# Patient Record
Sex: MALE | Race: WHITE | NOT HISPANIC OR LATINO | Employment: OTHER | URBAN - METROPOLITAN AREA
[De-identification: names, ages, dates, MRNs, and addresses within clinical notes are randomized per-mention and may not be internally consistent; named-entity substitution may affect disease eponyms.]

---

## 2019-11-12 RX ORDER — GLIPIZIDE AND METFORMIN HCL 2.5; 5 MG/1; MG/1
TABLET, FILM COATED ORAL
Qty: 180 TABLET | Refills: 3 | Status: SHIPPED | OUTPATIENT
Start: 2019-11-12 | End: 2020-02-27 | Stop reason: SDUPTHER

## 2019-11-25 DIAGNOSIS — I10 HTN (HYPERTENSION): ICD-10-CM

## 2019-11-25 RX ORDER — LISINOPRIL 20 MG/1
TABLET ORAL
Qty: 90 TABLET | Refills: 3 | Status: ON HOLD | OUTPATIENT
Start: 2019-11-25 | End: 2019-12-06 | Stop reason: HOSPADM

## 2019-11-25 RX ORDER — AMLODIPINE BESYLATE 5 MG/1
TABLET ORAL
Qty: 90 TABLET | Refills: 3 | Status: ON HOLD | OUTPATIENT
Start: 2019-11-25 | End: 2019-12-06 | Stop reason: HOSPADM

## 2019-12-02 ENCOUNTER — HOSPITAL ENCOUNTER (INPATIENT)
Facility: HOSPITAL | Age: 84
LOS: 4 days | Discharge: HOME HEALTH | DRG: 280 | End: 2019-12-06
Attending: EMERGENCY MEDICINE | Admitting: INTERNAL MEDICINE
Payer: MEDICARE

## 2019-12-02 ENCOUNTER — OFFICE VISIT (OUTPATIENT)
Dept: FAMILY MEDICINE | Facility: CLINIC | Age: 84
End: 2019-12-02
Payer: MEDICARE

## 2019-12-02 VITALS
HEIGHT: 70 IN | OXYGEN SATURATION: 84 % | DIASTOLIC BLOOD PRESSURE: 94 MMHG | RESPIRATION RATE: 18 BRPM | SYSTOLIC BLOOD PRESSURE: 162 MMHG | BODY MASS INDEX: 32.98 KG/M2 | WEIGHT: 230.38 LBS | TEMPERATURE: 98 F | HEART RATE: 93 BPM

## 2019-12-02 DIAGNOSIS — R06.02 SOB (SHORTNESS OF BREATH): Primary | ICD-10-CM

## 2019-12-02 DIAGNOSIS — I10 HYPERTENSION, UNSPECIFIED TYPE: ICD-10-CM

## 2019-12-02 DIAGNOSIS — J96.01 ACUTE RESPIRATORY FAILURE WITH HYPOXIA: ICD-10-CM

## 2019-12-02 DIAGNOSIS — R54 AGE-RELATED PHYSICAL DEBILITY: ICD-10-CM

## 2019-12-02 DIAGNOSIS — I50.9 CONGESTIVE HEART FAILURE, UNSPECIFIED HF CHRONICITY, UNSPECIFIED HEART FAILURE TYPE: Primary | ICD-10-CM

## 2019-12-02 DIAGNOSIS — E11.9 TYPE 2 DIABETES MELLITUS WITHOUT COMPLICATION, WITHOUT LONG-TERM CURRENT USE OF INSULIN: ICD-10-CM

## 2019-12-02 DIAGNOSIS — R79.89 ELEVATED TROPONIN: ICD-10-CM

## 2019-12-02 DIAGNOSIS — I45.3 BUNDLE BRANCH BLOCK, TRIFASCICULAR: ICD-10-CM

## 2019-12-02 DIAGNOSIS — R06.02 SOB (SHORTNESS OF BREATH): ICD-10-CM

## 2019-12-02 DIAGNOSIS — R06.02 SHORTNESS OF BREATH: ICD-10-CM

## 2019-12-02 PROBLEM — J96.91 RESPIRATORY FAILURE WITH HYPOXIA: Status: ACTIVE | Noted: 2019-12-02

## 2019-12-02 PROBLEM — J81.1 PULMONARY EDEMA: Status: ACTIVE | Noted: 2019-12-02

## 2019-12-02 LAB
ALBUMIN SERPL BCP-MCNC: 3.8 G/DL (ref 3.5–5.2)
ALP SERPL-CCNC: 104 U/L (ref 55–135)
ALT SERPL W/O P-5'-P-CCNC: 30 U/L (ref 10–44)
ANION GAP SERPL CALC-SCNC: 10 MMOL/L (ref 8–16)
AST SERPL-CCNC: 19 U/L (ref 10–40)
BASOPHILS # BLD AUTO: 0.02 K/UL (ref 0–0.2)
BASOPHILS NFR BLD: 0.3 % (ref 0–1.9)
BILIRUB SERPL-MCNC: 1.4 MG/DL (ref 0.1–1)
BNP SERPL-MCNC: 1244 PG/ML (ref 0–99)
BUN SERPL-MCNC: 16 MG/DL (ref 8–23)
CALCIUM SERPL-MCNC: 9.7 MG/DL (ref 8.7–10.5)
CHLORIDE SERPL-SCNC: 106 MMOL/L (ref 95–110)
CHOLEST SERPL-MCNC: 166 MG/DL (ref 120–199)
CHOLEST/HDLC SERPL: 4 {RATIO} (ref 2–5)
CO2 SERPL-SCNC: 25 MMOL/L (ref 23–29)
CREAT SERPL-MCNC: 1.3 MG/DL (ref 0.5–1.4)
DIFFERENTIAL METHOD: NORMAL
EOSINOPHIL # BLD AUTO: 0.1 K/UL (ref 0–0.5)
EOSINOPHIL NFR BLD: 0.9 % (ref 0–8)
ERYTHROCYTE [DISTWIDTH] IN BLOOD BY AUTOMATED COUNT: 14.2 % (ref 11.5–14.5)
EST. GFR  (AFRICAN AMERICAN): 58 ML/MIN/1.73 M^2
EST. GFR  (NON AFRICAN AMERICAN): 50 ML/MIN/1.73 M^2
ESTIMATED AVG GLUCOSE: 157 MG/DL (ref 68–131)
GLUCOSE SERPL-MCNC: 217 MG/DL (ref 70–110)
HBA1C MFR BLD HPLC: 7.1 % (ref 4–5.6)
HCT VFR BLD AUTO: 53.7 % (ref 40–54)
HDLC SERPL-MCNC: 42 MG/DL (ref 40–75)
HDLC SERPL: 25.3 % (ref 20–50)
HGB BLD-MCNC: 17.2 G/DL (ref 14–18)
INFLUENZA A, MOLECULAR: NEGATIVE
INFLUENZA B, MOLECULAR: NEGATIVE
INR PPP: 1.1 (ref 0.8–1.2)
LDLC SERPL CALC-MCNC: 102.8 MG/DL (ref 63–159)
LYMPHOCYTES # BLD AUTO: 1.5 K/UL (ref 1–4.8)
LYMPHOCYTES NFR BLD: 20.9 % (ref 18–48)
MCH RBC QN AUTO: 31 PG (ref 27–31)
MCHC RBC AUTO-ENTMCNC: 32 G/DL (ref 32–36)
MCV RBC AUTO: 97 FL (ref 82–98)
MONOCYTES # BLD AUTO: 0.5 K/UL (ref 0.3–1)
MONOCYTES NFR BLD: 7.2 % (ref 4–15)
NEUTROPHILS # BLD AUTO: 4.9 K/UL (ref 1.8–7.7)
NEUTROPHILS NFR BLD: 70.7 % (ref 38–73)
NONHDLC SERPL-MCNC: 124 MG/DL
PLATELET # BLD AUTO: 218 K/UL (ref 150–350)
PMV BLD AUTO: 11 FL (ref 9.2–12.9)
POCT GLUCOSE: 177 MG/DL (ref 70–110)
POTASSIUM SERPL-SCNC: 4.2 MMOL/L (ref 3.5–5.1)
PROCALCITONIN SERPL IA-MCNC: 0.04 NG/ML
PROT SERPL-MCNC: 9 G/DL (ref 6–8.4)
PROTHROMBIN TIME: 11.4 SEC (ref 9–12.5)
RBC # BLD AUTO: 5.54 M/UL (ref 4.6–6.2)
SODIUM SERPL-SCNC: 141 MMOL/L (ref 136–145)
SPECIMEN SOURCE: NORMAL
TRIGL SERPL-MCNC: 106 MG/DL (ref 30–150)
TROPONIN I SERPL DL<=0.01 NG/ML-MCNC: 0.17 NG/ML (ref 0–0.03)
WBC # BLD AUTO: 6.98 K/UL (ref 3.9–12.7)

## 2019-12-02 PROCEDURE — 25000003 PHARM REV CODE 250: Mod: HCNC | Performed by: EMERGENCY MEDICINE

## 2019-12-02 PROCEDURE — 99213 PR OFFICE/OUTPT VISIT, EST, LEVL III, 20-29 MIN: ICD-10-PCS | Mod: HCNC,S$GLB,, | Performed by: INTERNAL MEDICINE

## 2019-12-02 PROCEDURE — 85025 COMPLETE CBC W/AUTO DIFF WBC: CPT | Mod: HCNC

## 2019-12-02 PROCEDURE — 1126F PR PAIN SEVERITY QUANTIFIED, NO PAIN PRESENT: ICD-10-PCS | Mod: HCNC,S$GLB,, | Performed by: INTERNAL MEDICINE

## 2019-12-02 PROCEDURE — 3077F SYST BP >= 140 MM HG: CPT | Mod: HCNC,CPTII,S$GLB, | Performed by: INTERNAL MEDICINE

## 2019-12-02 PROCEDURE — 93005 ELECTROCARDIOGRAM TRACING: CPT | Mod: HCNC

## 2019-12-02 PROCEDURE — 20000000 HC ICU ROOM: Mod: HCNC

## 2019-12-02 PROCEDURE — 96374 THER/PROPH/DIAG INJ IV PUSH: CPT | Mod: HCNC

## 2019-12-02 PROCEDURE — 25000003 PHARM REV CODE 250: Mod: HCNC | Performed by: STUDENT IN AN ORGANIZED HEALTH CARE EDUCATION/TRAINING PROGRAM

## 2019-12-02 PROCEDURE — 3077F PR MOST RECENT SYSTOLIC BLOOD PRESSURE >= 140 MM HG: ICD-10-PCS | Mod: HCNC,CPTII,S$GLB, | Performed by: INTERNAL MEDICINE

## 2019-12-02 PROCEDURE — 63600175 PHARM REV CODE 636 W HCPCS: Mod: HCNC | Performed by: EMERGENCY MEDICINE

## 2019-12-02 PROCEDURE — 1101F PT FALLS ASSESS-DOCD LE1/YR: CPT | Mod: HCNC,CPTII,S$GLB, | Performed by: INTERNAL MEDICINE

## 2019-12-02 PROCEDURE — 1159F MED LIST DOCD IN RCRD: CPT | Mod: HCNC,S$GLB,, | Performed by: INTERNAL MEDICINE

## 2019-12-02 PROCEDURE — 99213 OFFICE O/P EST LOW 20 MIN: CPT | Mod: HCNC,S$GLB,, | Performed by: INTERNAL MEDICINE

## 2019-12-02 PROCEDURE — 1126F AMNT PAIN NOTED NONE PRSNT: CPT | Mod: HCNC,S$GLB,, | Performed by: INTERNAL MEDICINE

## 2019-12-02 PROCEDURE — 1101F PR PT FALLS ASSESS DOC 0-1 FALLS W/OUT INJ PAST YR: ICD-10-PCS | Mod: HCNC,CPTII,S$GLB, | Performed by: INTERNAL MEDICINE

## 2019-12-02 PROCEDURE — 63600175 PHARM REV CODE 636 W HCPCS: Mod: HCNC | Performed by: STUDENT IN AN ORGANIZED HEALTH CARE EDUCATION/TRAINING PROGRAM

## 2019-12-02 PROCEDURE — 83880 ASSAY OF NATRIURETIC PEPTIDE: CPT | Mod: HCNC

## 2019-12-02 PROCEDURE — 27000190 HC CPAP FULL FACE MASK W/VALVE: Mod: HCNC

## 2019-12-02 PROCEDURE — 3080F PR MOST RECENT DIASTOLIC BLOOD PRESSURE >= 90 MM HG: ICD-10-PCS | Mod: HCNC,CPTII,S$GLB, | Performed by: INTERNAL MEDICINE

## 2019-12-02 PROCEDURE — 1159F PR MEDICATION LIST DOCUMENTED IN MEDICAL RECORD: ICD-10-PCS | Mod: HCNC,S$GLB,, | Performed by: INTERNAL MEDICINE

## 2019-12-02 PROCEDURE — 99291 CRITICAL CARE FIRST HOUR: CPT | Mod: 25,HCNC

## 2019-12-02 PROCEDURE — 3080F DIAST BP >= 90 MM HG: CPT | Mod: HCNC,CPTII,S$GLB, | Performed by: INTERNAL MEDICINE

## 2019-12-02 PROCEDURE — 80061 LIPID PANEL: CPT | Mod: HCNC

## 2019-12-02 PROCEDURE — 84145 PROCALCITONIN (PCT): CPT | Mod: HCNC

## 2019-12-02 PROCEDURE — 87502 INFLUENZA DNA AMP PROBE: CPT | Mod: HCNC

## 2019-12-02 PROCEDURE — 84484 ASSAY OF TROPONIN QUANT: CPT | Mod: 91,HCNC

## 2019-12-02 PROCEDURE — 83036 HEMOGLOBIN GLYCOSYLATED A1C: CPT | Mod: HCNC

## 2019-12-02 PROCEDURE — 84484 ASSAY OF TROPONIN QUANT: CPT | Mod: HCNC

## 2019-12-02 PROCEDURE — 99999 PR PBB SHADOW E&M-EST. PATIENT-LVL III: CPT | Mod: PBBFAC,HCNC,, | Performed by: INTERNAL MEDICINE

## 2019-12-02 PROCEDURE — 99999 PR PBB SHADOW E&M-EST. PATIENT-LVL III: ICD-10-PCS | Mod: PBBFAC,HCNC,, | Performed by: INTERNAL MEDICINE

## 2019-12-02 PROCEDURE — 80053 COMPREHEN METABOLIC PANEL: CPT | Mod: HCNC

## 2019-12-02 PROCEDURE — 85610 PROTHROMBIN TIME: CPT | Mod: HCNC

## 2019-12-02 PROCEDURE — 99900035 HC TECH TIME PER 15 MIN (STAT): Mod: HCNC

## 2019-12-02 PROCEDURE — 94660 CPAP INITIATION&MGMT: CPT | Mod: HCNC

## 2019-12-02 RX ORDER — FUROSEMIDE 10 MG/ML
40 INJECTION INTRAMUSCULAR; INTRAVENOUS
Status: DISCONTINUED | OUTPATIENT
Start: 2019-12-03 | End: 2019-12-04

## 2019-12-02 RX ORDER — IBUPROFEN 200 MG
16 TABLET ORAL
Status: DISCONTINUED | OUTPATIENT
Start: 2019-12-02 | End: 2019-12-06 | Stop reason: HOSPADM

## 2019-12-02 RX ORDER — SAXAGLIPTIN 5 MG/1
TABLET, FILM COATED ORAL
COMMUNITY
Start: 2015-02-12 | End: 2019-12-02

## 2019-12-02 RX ORDER — FUROSEMIDE 10 MG/ML
60 INJECTION INTRAMUSCULAR; INTRAVENOUS
Status: COMPLETED | OUTPATIENT
Start: 2019-12-02 | End: 2019-12-02

## 2019-12-02 RX ORDER — LISINOPRIL 20 MG/1
20 TABLET ORAL DAILY
Status: DISCONTINUED | OUTPATIENT
Start: 2019-12-03 | End: 2019-12-03

## 2019-12-02 RX ORDER — LEVOFLOXACIN 5 MG/ML
750 INJECTION, SOLUTION INTRAVENOUS
Status: DISCONTINUED | OUTPATIENT
Start: 2019-12-02 | End: 2019-12-03

## 2019-12-02 RX ORDER — NITROGLYCERIN 20 MG/100ML
5 INJECTION INTRAVENOUS CONTINUOUS
Status: DISCONTINUED | OUTPATIENT
Start: 2019-12-02 | End: 2019-12-03

## 2019-12-02 RX ORDER — SIMVASTATIN 20 MG/1
20 TABLET, FILM COATED ORAL NIGHTLY
Status: DISCONTINUED | OUTPATIENT
Start: 2019-12-02 | End: 2019-12-06 | Stop reason: HOSPADM

## 2019-12-02 RX ORDER — ASPIRIN 81 MG/1
81 TABLET ORAL 2 TIMES DAILY
Status: DISCONTINUED | OUTPATIENT
Start: 2019-12-02 | End: 2019-12-03

## 2019-12-02 RX ORDER — LEVOFLOXACIN 5 MG/ML
500 INJECTION, SOLUTION INTRAVENOUS
Status: DISCONTINUED | OUTPATIENT
Start: 2019-12-02 | End: 2019-12-02

## 2019-12-02 RX ORDER — AMLODIPINE BESYLATE 5 MG/1
5 TABLET ORAL DAILY
Status: DISCONTINUED | OUTPATIENT
Start: 2019-12-03 | End: 2019-12-03

## 2019-12-02 RX ORDER — LANCETS
EACH MISCELLANEOUS
COMMUNITY
Start: 2019-11-25 | End: 2020-03-31

## 2019-12-02 RX ORDER — NITROGLYCERIN 0.4 MG/1
0.4 TABLET SUBLINGUAL ONCE
Status: COMPLETED | OUTPATIENT
Start: 2019-12-02 | End: 2019-12-02

## 2019-12-02 RX ORDER — HEPARIN SODIUM 5000 [USP'U]/ML
5000 INJECTION, SOLUTION INTRAVENOUS; SUBCUTANEOUS EVERY 12 HOURS
Status: DISCONTINUED | OUTPATIENT
Start: 2019-12-02 | End: 2019-12-03

## 2019-12-02 RX ORDER — INSULIN ASPART 100 [IU]/ML
0-5 INJECTION, SOLUTION INTRAVENOUS; SUBCUTANEOUS
Status: DISCONTINUED | OUTPATIENT
Start: 2019-12-02 | End: 2019-12-06 | Stop reason: HOSPADM

## 2019-12-02 RX ORDER — SODIUM CHLORIDE 0.9 % (FLUSH) 0.9 %
10 SYRINGE (ML) INJECTION
Status: DISCONTINUED | OUTPATIENT
Start: 2019-12-02 | End: 2019-12-06 | Stop reason: HOSPADM

## 2019-12-02 RX ORDER — GLUCAGON 1 MG
1 KIT INJECTION
Status: DISCONTINUED | OUTPATIENT
Start: 2019-12-02 | End: 2019-12-06 | Stop reason: HOSPADM

## 2019-12-02 RX ORDER — IBUPROFEN 200 MG
24 TABLET ORAL
Status: DISCONTINUED | OUTPATIENT
Start: 2019-12-02 | End: 2019-12-06 | Stop reason: HOSPADM

## 2019-12-02 RX ORDER — BLOOD SUGAR DIAGNOSTIC
STRIP MISCELLANEOUS
COMMUNITY
Start: 2019-11-25 | End: 2020-03-31

## 2019-12-02 RX ADMIN — NITROGLYCERIN 0.4 MG: 0.4 TABLET, ORALLY DISINTEGRATING SUBLINGUAL at 05:12

## 2019-12-02 RX ADMIN — NITROGLYCERIN 5 MCG/MIN: 20 INJECTION INTRAVENOUS at 10:12

## 2019-12-02 RX ADMIN — HEPARIN SODIUM 5000 UNITS: 5000 INJECTION, SOLUTION INTRAVENOUS; SUBCUTANEOUS at 09:12

## 2019-12-02 RX ADMIN — FUROSEMIDE 60 MG: 10 INJECTION, SOLUTION INTRAMUSCULAR; INTRAVENOUS at 03:12

## 2019-12-02 RX ADMIN — ASPIRIN 81 MG: 81 TABLET, COATED ORAL at 09:12

## 2019-12-02 RX ADMIN — NITROGLYCERIN 1 INCH: 20 OINTMENT TOPICAL at 04:12

## 2019-12-02 RX ADMIN — LEVOFLOXACIN 750 MG: 750 INJECTION, SOLUTION INTRAVENOUS at 09:12

## 2019-12-02 RX ADMIN — SIMVASTATIN 20 MG: 20 TABLET, FILM COATED ORAL at 09:12

## 2019-12-02 NOTE — ED PROVIDER NOTES
Encounter Date: 12/2/2019    SCRIBE #1 NOTE: I, Monica Arce, am scribing for, and in the presence of,  Dr. Hayden Tucker. I have scribed the entire note.       History     Chief Complaint   Patient presents with    Shortness of Breath     c/o SOB on exertion that has been worsening for the past few weeks. Saw his pcp today and was told to come to the ER.      Raf Fine is a 84 y.o. male who  has a past medical history of Diabetes mellitus, type 2, Hyperlipidemia, Hypertension, and Stroke.    The patient presents to the ED due to shortness of breath that commenced a few weeks ago. Patient's wife reports shortness of breath has gradually worsened in the last week. He was seen by PCP prior to arrival and was told O2 saturation was low. PCP recommended further evaluation. Patient denies any fever or abdominal pain. Wife also denies any sick household members recently.     The history is provided by the patient and the spouse.     Review of patient's allergies indicates:  No Known Allergies  Past Medical History:   Diagnosis Date    Diabetes mellitus, type 2     Hyperlipidemia     Hypertension     Stroke      Past Surgical History:   Procedure Laterality Date    APPENDECTOMY      FOOT SURGERY      HERNIA REPAIR      LEG SURGERY      PROSTATE SURGERY      TONSILLECTOMY       No family history on file.  Social History     Tobacco Use    Smoking status: Former Smoker    Smokeless tobacco: Former User   Substance Use Topics    Alcohol use: No    Drug use: No     Review of Systems   Constitutional: Negative for chills, fatigue and fever.   HENT: Negative for facial swelling, trouble swallowing and voice change.    Respiratory: Positive for shortness of breath. Negative for cough and choking.    Cardiovascular: Negative for chest pain, palpitations and leg swelling.   Gastrointestinal: Negative for abdominal pain, diarrhea, nausea and vomiting.   Genitourinary: Negative for dysuria, frequency and urgency.    Musculoskeletal: Negative for back pain, neck pain and neck stiffness.   Neurological: Negative for seizures, speech difficulty, light-headedness, numbness and headaches.   All other systems reviewed and are negative.      Physical Exam     Initial Vitals [12/02/19 1248]   BP Pulse Resp Temp SpO2   (!) 198/94 96 (!) 30 97.4 °F (36.3 °C) (!) 74 %      MAP       --         Physical Exam    Nursing note and vitals reviewed.  Constitutional: He appears well-developed and well-nourished. He appears distressed.   Mild distress.    HENT:   Head: Normocephalic and atraumatic.   Mouth/Throat: Oropharynx is clear and moist.   Eyes: Conjunctivae and EOM are normal. Pupils are equal, round, and reactive to light.   Neck: Normal range of motion. Neck supple.   Cardiovascular: Normal rate, regular rhythm, normal heart sounds and intact distal pulses.   Pulmonary/Chest: No respiratory distress. He has no wheezes. He has no rhonchi. He has rales.   Bibasilar rales bilaterally.   Abdominal: Soft. Bowel sounds are normal. He exhibits no distension. There is no tenderness.   Musculoskeletal: Normal range of motion. He exhibits edema. He exhibits no tenderness.   2+ bilateral pretibial edema.    Neurological: He is alert and oriented to person, place, and time. He has normal strength. No cranial nerve deficit.   Skin: Skin is warm and dry. Capillary refill takes less than 2 seconds.         ED Course   Critical Care  Date/Time: 12/3/2019 6:26 AM  Performed by: Hayden Tucker MD  Authorized by: Anuel Garcia MD   Total critical care time (exclusive of procedural time) : 35 minutes        Labs Reviewed   COMPREHENSIVE METABOLIC PANEL - Abnormal; Notable for the following components:       Result Value    Glucose 217 (*)     Total Protein 9.0 (*)     Total Bilirubin 1.4 (*)     eGFR if  58 (*)     eGFR if non  50 (*)     All other components within normal limits   TROPONIN I - Abnormal; Notable for the  following components:    Troponin I 0.168 (*)     All other components within normal limits   B-TYPE NATRIURETIC PEPTIDE - Abnormal; Notable for the following components:    BNP 1,244 (*)     All other components within normal limits   CBC W/ AUTO DIFFERENTIAL   PROTIME-INR     EKG Readings: (Independently Interpreted)   1324. Rate of 84. Sinus Rhythm with 1st degree AV block. Bifasicular block. LVH.      ECG Results          EKG 12-lead (In process)  Result time 12/02/19 13:35:48    In process by Interface, Lab In [facility] (12/02/19 13:35:48)                 Narrative:    Test Reason : R06.02,    Vent. Rate : 084 BPM     Atrial Rate : 084 BPM     P-R Int : 256 ms          QRS Dur : 142 ms      QT Int : 420 ms       P-R-T Axes : 062 -75 032 degrees     QTc Int : 496 ms    Sinus rhythm with 1st degree A-V block  Possible Left atrial enlargement  Right bundle branch block  Left anterior fascicular block   Bifascicular block   LVH  Abnormal ECG  When compared with ECG of 20-NOV-2008 12:07,  Right bundle branch block is now Present    Referred By: [email or web address]   SELF           Confirmed By:                             X-Rays:   Independently Interpreted Readings:   Other Readings:  Reviewed by myself, read by radiology.     Imaging Results          X-Ray Chest AP Portable (Final result)  Result time 12/02/19 14:52:34    Final result by Deric Garcia MD (12/02/19 14:52:34)                 Impression:      Findings suggestive of pulmonary edema.      Electronically signed by: Deric Garcia MD  Date:    12/02/2019  Time:    14:52             Narrative:    EXAMINATION:  XR CHEST AP PORTABLE    CLINICAL HISTORY:  shortness of breath;    TECHNIQUE:  Single frontal view of the chest was performed.    COMPARISON:  02/25/2008    FINDINGS:  Moderate cardiomegaly similar to prior exam.  Atherosclerosis of the aortic arch.  Prominence of pulmonary vasculature.  Prominent interstitial lung markings with a lower lobe and  bilateral predominance which may be seen with pulmonary edema or infectious process.  No pleural effusion.  No pneumothorax.  Degenerative changes of the spine.                              Medical Decision Making:   Clinical Tests:   Lab Tests: Ordered and Reviewed  Radiological Study: Ordered and Reviewed  Medical Tests: Ordered and Reviewed  ED Management:  No availability for patient preference transfer to the [facility]. Patient will be attended to here by [facility]. Patient improved on BIPAP.               Attending Attestation:           Physician Attestation for Scribe:  Physician Attestation Statement for Scribe #1: I, Dr. Hayden Tucker, reviewed documentation, as scribed by Monica Arce in my presence, and it is both accurate and complete.                 ED Course as of Dec 03 0624   Mon Dec 02, 2019   1651 Patient's granddaughter, who is a NP, arrived and is requesting patient to be transferred to [facility]. Patient's O2 sats have fallen to low 90's. Will institute a low BiPAP.    [AF]      ED Course User Index  [AF] Monica Arce                Clinical Impression:     1. Congestive heart failure, unspecified HF chronicity, unspecified heart failure type    2. Shortness of breath    3. Elevated troponin                                Hayden Tucker MD  12/03/19 3841

## 2019-12-02 NOTE — MEDICAL/APP STUDENT
[facility] Medicine H&P Note     Admitting Team: [facility] Hospitalist Team A  Attending Physician: Anuel Garcia MD    Date of Admit: 12/2/2019    Chief Complaint     Shortness of Breath (c/o SOB on exertion that has been worsening for the past few weeks. Saw his pcp today and was told to come to the ER. )   for 1 week    Subjective:      History of Present Illness:  Raf Fine is a 84 y.o.  male who  has a past medical history of Diabetes mellitus, type 2, Hyperlipidemia, Hypertension, and Stroke.. The patient presented to [facility] on 12/2/2019 with a primary complaint of Shortness of Breath (c/o SOB on exertion that has been worsening for the past few weeks. Saw his pcp today and was told to come to the ER. )    History provided by patient and family at bedside. The patient was in their usual state of health until 1 week ago when he was noticed to have increased SoB with daily activities. It is progressively worsening. He has had to stop and rest more frequently. He has been having sinus congestion for about 2 weeks and has had a productive cough of yellow sputum about a teaspoon sized amount for 1 week. He appreciates fatigue. Nothing makes it better or worse. This has not happened before. Patient went to PCP today for established visit and was sent to ER due to low O2 sats at rest. He denies orthopnea, weight changes, fever, chills, sick contacts, chest pain, and abdominal pain.     Past Medical History:  Past Medical History:   Diagnosis Date    Diabetes mellitus, type 2     Hyperlipidemia     Hypertension     Stroke        Past Surgical History:  Past Surgical History:   Procedure Laterality Date    APPENDECTOMY      FOOT SURGERY      HERNIA REPAIR      LEG SURGERY      TURP      TONSILLECTOMY         Allergies:  Review of patient's allergies indicates:  No Known Allergies    Home Medications:  Prior to Admission medications    Medication Sig Start Date End Date  Taking? Authorizing Provider   ACCU-CHESAVANA EDITH PLUS TEST STRP Strp  19   Historical Provider, MD   ACCU-CHESAVANA SOFTCLIX LANCETS Misc  19   Historical Provider, MD   amLODIPine (NORVASC) 5 MG tablet TAKE 1 TABLET EVERY DAY 19   Oral Alberts MD   aspirin (ECOTRIN) 81 MG EC tablet Take 81 mg by mouth 2 (two) times daily.    Historical Provider, MD   B2/vits A,C,E/lut/zeaxanth/min (ICAPS ORAL) Take by mouth. 11   Historical Provider, MD   glipizide-metformin (METAGLIP) 2.5-500 mg per tablet TAKE 1 TABLET TWICE DAILY 19   Oral Alberts MD   lisinopril (PRINIVIL,ZESTRIL) 20 MG tablet TAKE 1 TABLET EVERY DAY 19   Oral Alberts MD   simvastatin (ZOCOR) 20 MG tablet Take 1 tablet (20 mg total) by mouth every evening. 13   Carmine Emmanuel MD   SITagliptin (JANUVIA) 100 MG Tab Take 100 mg by mouth once daily.    Historical Provider, MD   docosahexanoic acid-vit C-lut 180-15-5 mg Cap Take by mouth.  19  Historical Provider, MD   FLUZONE HIGH-DOSE 2019-20, PF, 180 mcg/0.5 mL Syrg ADM 0.5ML IM UTD 19  Historical Provider, MD   mirtazapine (REMERON) 15 MG tablet Take 1 tablet (15 mg total) by mouth every evening. 3/12/13 12/2/19  Carmine Emmanuel MD   SAXagliptin (ONGLYZA) 5 mg Tab tablet Take by mouth. 2/12/15 12/2/19  Historical Provider, MD       Family History:  No family history on file.    Social History:  Social History     Tobacco Use    Smoking status: Former Smoker 50 pack years; quit    Smokeless tobacco: Former User   Substance Use Topics    Alcohol use: No    Drug use: No       Review of Systems:  All other systems are reviewed and are negative.    Health Maintaince :   Primary Care Physician: Oral Alberts    Immunizations:   Flu UTD     Objective:   Last 24 Hour Vital Signs:  BP  Min: 162/94  Max: 203/123  Temp  Av.5 °F (36.4 °C)  Min: 97.4 °F (36.3 °C)  Max: 97.6 °F (36.4 °C)  Pulse  Av.3  Min: 80  Max:  "96  Resp  Av.5  Min: 18  Max: 32  SpO2  Av.7 %  Min: 74 %  Max: 96 %  Height  Av' 10" (177.8 cm)  Min: 5' 10" (177.8 cm)  Max: 5' 10" (177.8 cm)  Weight  Av.5 kg (230 lb 6.1 oz)  Min: 104.5 kg (230 lb 6.1 oz)  Max: 104.5 kg (230 lb 6.1 oz)  Body mass index is 33.06 kg/m².  No intake/output data recorded.    Physical Examination:  Physical Exam   Constitutional: He appears well-developed and well-nourished. No distress.   HENT:   Head: Normocephalic and atraumatic.   Eyes: EOM are normal.   Neck: Neck supple. No tracheal deviation present.   Cardiovascular: Normal rate, regular rhythm, normal heart sounds and intact distal pulses.   No murmur heard.  Pulmonary/Chest: No respiratory distress. He has rales. He exhibits no tenderness.   On Bipap during exam  Crackles in lower right lung field   Abdominal: Soft. Bowel sounds are normal. He exhibits no distension and no mass. There is no tenderness.   Musculoskeletal: He exhibits edema (2+ to knees bilaterally).   Lymphadenopathy:     He has no cervical adenopathy.   Neurological: He is alert.       Laboratory:  Most Recent Data:  CBC:   Lab Results   Component Value Date    WBC 6.98 2019    HGB 17.2 2019    HCT 53.7 2019     2019    MCV 97 2019    RDW 14.2 2019     WBC Differential: 70.7% N, 20.9% L, 7.2% M, 0.9% Eo, 0.3% Baso    BMP:   Lab Results   Component Value Date     2019    K 4.2 2019     2019    CO2 25 2019    BUN 16 2019    CREATININE 1.3 2019     (H) 2019    CALCIUM 9.7 2019     LFTs:   Lab Results   Component Value Date    PROT 9.0 (H) 2019    ALBUMIN 3.8 2019    BILITOT 1.4 (H) 2019    AST 19 2019    ALKPHOS 104 2019    ALT 30 2019     Coags:   Lab Results   Component Value Date    INR 1.1 2019     FLP:   Lab Results   Component Value Date    CHOL 190 04/15/2013    HDL 43 04/15/2013    " LDLCALC 107.0 04/15/2013    TRIG 198 (H) 04/15/2013    CHOLHDL 22.6 04/15/2013     DM:   Lab Results   Component Value Date    HGBA1C 8.0 (H) 06/18/2013    HGBA1C 8.8 (H) 05/17/2013    HGBA1C 7.5 (H) 05/28/2012    LDLCALC 107.0 04/15/2013    CREATININE 1.3 12/02/2019     Thyroid:   Lab Results   Component Value Date    TSH 0.776 04/15/2013     Anemia: No results found for: IRON, TIBC, FERRITIN, FIEAWUKV50, FOLATE  Cardiac:   Lab Results   Component Value Date    TROPONINI 0.168 (H) 12/02/2019    BNP 1,244 (H) 12/02/2019     Urinalysis:   Lab Results   Component Value Date    COLORU YELLOW 04/15/2013    SPECGRAV 1.016 04/15/2013    NITRITE Negative 04/15/2013    KETONESU Negative 04/15/2013    UROBILINOGEN 0.2 04/15/2013    WBCUA 2 04/15/2013       Trended Lab Data:  Recent Labs   Lab 12/02/19  1311   WBC 6.98   HGB 17.2   HCT 53.7      MCV 97   RDW 14.2      K 4.2      CO2 25   BUN 16   CREATININE 1.3   *   PROT 9.0*   ALBUMIN 3.8   BILITOT 1.4*   AST 19   ALKPHOS 104   ALT 30       Trended Cardiac Data:  Recent Labs   Lab 12/02/19  1311   TROPONINI 0.168*   BNP 1,244*       Other Results:  EKG (my interpretation): Sinus rhythm w/ 1st degree AV block. LVH    Radiology:  Imaging Results          X-Ray Chest AP Portable (Final result)  Result time 12/02/19 14:52:34    Final result by Deric Garcia MD (12/02/19 14:52:34)                 Impression:      Findings suggestive of pulmonary edema.      Electronically signed by: Deric Garcia MD  Date:    12/02/2019  Time:    14:52             Narrative:    EXAMINATION:  XR CHEST AP PORTABLE    CLINICAL HISTORY:  shortness of breath;    TECHNIQUE:  Single frontal view of the chest was performed.    COMPARISON:  02/25/2008    FINDINGS:  Moderate cardiomegaly similar to prior exam.  Atherosclerosis of the aortic arch.  Prominence of pulmonary vasculature.  Prominent interstitial lung markings with a lower lobe and bilateral predominance which  may be seen with pulmonary edema or infectious process.  No pleural effusion.  No pneumothorax.  Degenerative changes of the spine.                                 Assessment:     Raf Fine is a 84 y.o. male with:  Patient Active Problem List    Diagnosis Date Noted    SOB (shortness of breath) 12/02/2019    Diabetes mellitus, type II 05/27/2013    HBP (high blood pressure) 05/27/2013    Type II or unspecified type diabetes mellitus without mention of complication, not stated as uncontrolled 04/18/2013        Plan:     Acute Hypoxic Respiratory Failure 2/2 new HF exacerbation  - 1 week progressively worsening shortness of breath  - BNP 1244, troponin 0.168; will trend troponins  - CXR 12/2 increased interstitial markings and vascular prominence suggestive of pulmonary edema  - patient started on Bipap in ED due to O2 sats at 74%, will continue as patient's O2 sat has improved; will de-escalate with improvement  - given lasix 60 mg, nitroglycerin 0.4 mg SL, and nitroglycerin 2% ointment in ED  - will start lasix 80 mg IV BID  - echo ordered for assessment of cardiac function  - strict Is and Os    HTN  - /94 on admission  - continue home amlodipine, lisinopril  - will reassess patients BP after diuresis    DMII, uncontrolled  - glucose 217 on admission with last A1c in 2013 at 8  - order HbA1c  - holding home medications at this time  - start ssi and accuchecks     HLD  -continue home simvastatin 20 mg qpm     Dispo: pending improvement in SoB and adequate diuresis    Riki Duarte  4th Year Medical Student, [facility] Medicine Hospitalist Pager numbers:   [facility] Hospitalist Medicine Team A (Radha/Cesilia): [phone]  [facility] Hospitalist Medicine Team B (Sherry/Zoya):  [phone]

## 2019-12-02 NOTE — PROGRESS NOTES
[facility] Note    Chief Complaint      Chief Complaint   Patient presents with    Establish Care    Shortness of Breath       History of Present Illness      Raf Fine is a 84 y.o. male who presents today for   Chief Complaint   Patient presents with    Establish Care    Shortness of Breath   .  Patient comes to appointment today for establish visit but converted to acute visit related to extreme sob with  just conversing as well as low o2 sats 84 % at rest . See p.e.     HPI    No problem-specific Assessment & Plan notes found for this encounter.       Problem List Items Addressed This Visit        Other    SOB (shortness of breath) - Primary    Overview     Pulse ox 87 % in office , no history of copd . He has been coughing and taking mucinex for last several days . Because of low sats and obvious sob I have recommended er visit .                 Past Medical History:  Past Medical History:   Diagnosis Date    Diabetes mellitus, type 2     Hyperlipidemia     Hypertension     Stroke        Past Surgical History:  Past Surgical History:   Procedure Laterality Date    APPENDECTOMY      FOOT SURGERY      HERNIA REPAIR      LEG SURGERY      PROSTATE SURGERY      TONSILLECTOMY         Family History:  family history is not on file.     Social History:  Social History     Socioeconomic History    Marital status:      Spouse name: Not on file    Number of children: Not on file    Years of education: Not on file    Highest education level: Not on file   Occupational History    Not on file   Social Needs    Financial resource strain: Not on file    Food insecurity:     Worry: Not on file     Inability: Not on file    Transportation needs:     Medical: Not on file     Non-medical: Not on file   Tobacco Use    Smoking status: Former Smoker    Smokeless tobacco: Former User   Substance and Sexual Activity    Alcohol use: No    Drug use: No    Sexual  activity: Yes     Partners: Female   Lifestyle    Physical activity:     Days per week: Not on file     Minutes per session: Not on file    Stress: Not on file   Relationships    Social connections:     Talks on phone: Not on file     Gets together: Not on file     Attends Congregation service: Not on file     Active member of club or organization: Not on file     Attends meetings of clubs or organizations: Not on file     Relationship status: Not on file   Other Topics Concern    Not on file   Social History Narrative    Not on file       Review of Systems:   Review of Systems   Constitutional: Negative for fever.   Respiratory: Positive for cough and shortness of breath. Negative for sputum production.    Cardiovascular: Positive for leg swelling. Negative for chest pain and claudication.   Gastrointestinal: Negative for heartburn.   Genitourinary: Negative for dysuria.        Medications:  Outpatient Encounter Medications as of 12/2/2019   Medication Sig Dispense Refill    ACCU-CHEK EDITH PLUS TEST STRP Strp       ACCU-CHEK SOFTCLIX LANCETS Misc       amLODIPine (NORVASC) 5 MG tablet TAKE 1 TABLET EVERY DAY 90 tablet 3    aspirin (ECOTRIN) 81 MG EC tablet Take 81 mg by mouth 2 (two) times daily.      B2/vits A,C,E/lut/zeaxanth/min (ICAPS ORAL) Take by mouth.      glipizide-metformin (METAGLIP) 2.5-500 mg per tablet TAKE 1 TABLET TWICE DAILY 180 tablet 3    lisinopril (PRINIVIL,ZESTRIL) 20 MG tablet TAKE 1 TABLET EVERY DAY 90 tablet 3    simvastatin (ZOCOR) 20 MG tablet Take 1 tablet (20 mg total) by mouth every evening. 90 tablet 3    SITagliptin (JANUVIA) 100 MG Tab Take 100 mg by mouth once daily.      [DISCONTINUED] SAXagliptin (ONGLYZA) 5 mg Tab tablet Take by mouth.      [DISCONTINUED] docosahexanoic acid-vit C-lut 180-15-5 mg Cap Take by mouth.      [DISCONTINUED] FLUZONE HIGH-DOSE 2019-20, PF, 180 mcg/0.5 mL Syrg ADM 0.5ML IM UTD  0    [DISCONTINUED] mirtazapine (REMERON) 15 MG tablet Take 1  "tablet (15 mg total) by mouth every evening. 90 tablet 3     No facility-administered encounter medications on file as of 12/2/2019.        Allergies:  Review of patient's allergies indicates:  No Known Allergies      Physical Exam      Vitals:    12/02/19 1106   BP: (!) 162/94   Pulse: 93   Resp: 18   Temp: 97.6 °F (36.4 °C)        Vital Signs  Temp: 97.6 °F (36.4 °C)  Temp src: Oral  Pulse: 93  Resp: 18  SpO2: (!) 84 %  BP: (!) 162/94  BP Location: Right arm  Patient Position: Sitting  Pain Score: 0-No pain  Height and Weight  Height: 5' 10" (177.8 cm)  Weight: 104.5 kg (230 lb 6.1 oz)  BSA (Calculated - sq m): 2.27 sq meters  BMI (Calculated): 33.1  Weight in (lb) to have BMI = 25: 173.9]     Body mass index is 33.06 kg/m².    Physical Exam   Constitutional: He is oriented to person, place, and time. He appears well-developed and well-nourished.   HENT:   Head: Normocephalic.   Eyes: Pupils are equal, round, and reactive to light.   Neck: Normal range of motion. No thyromegaly present.   Cardiovascular: Normal rate and regular rhythm. Exam reveals no gallop and no friction rub.   No murmur heard.  2 plus edema    Pulmonary/Chest: Tachypnea noted. He is in respiratory distress. He has rales in the right middle field, the right lower field, the left middle field and the left lower field.   Abdominal: Soft. Bowel sounds are normal.   Musculoskeletal: Normal range of motion. He exhibits no edema.   Neurological: He is alert and oriented to person, place, and time. No sensory deficit.   Skin: Skin is warm and dry.   Psychiatric: He has a normal mood and affect. His behavior is normal.        Laboratory:  CBC:  No results for input(s): WBC, RBC, HGB, HCT, PLT, MCV, MCH, MCHC in the last 2160 hours.  CMP:  No results for input(s): GLU, CALCIUM, ALBUMIN, PROT, NA, K, CO2, CL, BUN, ALKPHOS, ALT, AST, BILITOT in the last 2160 hours.    Invalid input(s): CREATININ  URINALYSIS:  No results for input(s): COLORU, CLARITYU, " SPECGRAV, PHUR, PROTEINUA, GLUCOSEU, BILIRUBINCON, BLOODU, WBCU, RBCU, BACTERIA, MUCUS, NITRITE, LEUKOCYTESUR, UROBILINOGEN, HYALINECASTS in the last 2160 hours.   LIPIDS:  No results for input(s): TSH, HDL, CHOL, TRIG, LDLCALC, CHOLHDL, NONHDLCHOL, TOTALCHOLEST in the last 2160 hours.  TSH:  No results for input(s): TSH in the last 2160 hours.  A1C:  No results for input(s): HGBA1C in the last 2160 hours.    Radiology:        Assessment:     Raf Fine is a 84 y.o.male with:    SOB (shortness of breath)                Plan:     Problem List Items Addressed This Visit        Other    SOB (shortness of breath) - Primary    Overview     Pulse ox 87 % in office , no history of copd . He has been coughing and taking mucinex for last several days . Because of low sats and obvious sob I have recommended er visit .                As above, continue current medications and maintain follow up with specialists.  Return to clinic in 1  months.    Frederick W Dantagnan [facility] [place]

## 2019-12-03 PROBLEM — E66.09 CLASS 1 OBESITY DUE TO EXCESS CALORIES WITH SERIOUS COMORBIDITY AND BODY MASS INDEX (BMI) OF 30.0 TO 30.9 IN ADULT: Status: ACTIVE | Noted: 2019-12-03

## 2019-12-03 PROBLEM — I45.3: Status: ACTIVE | Noted: 2019-12-03

## 2019-12-03 LAB
ALBUMIN SERPL BCP-MCNC: 3.2 G/DL (ref 3.5–5.2)
ALP SERPL-CCNC: 87 U/L (ref 55–135)
ALT SERPL W/O P-5'-P-CCNC: 22 U/L (ref 10–44)
ANION GAP SERPL CALC-SCNC: 12 MMOL/L (ref 8–16)
ANION GAP SERPL CALC-SCNC: 14 MMOL/L (ref 8–16)
AORTIC ROOT ANNULUS: 4.22 CM
AORTIC VALVE CUSP SEPERATION: 1.44 CM
APTT BLDCRRT: 25 SEC (ref 21–32)
APTT BLDCRRT: 25 SEC (ref 21–32)
APTT BLDCRRT: 46.9 SEC (ref 21–32)
APTT BLDCRRT: 48.3 SEC (ref 21–32)
AST SERPL-CCNC: 15 U/L (ref 10–40)
AV INDEX (PROSTH): 0.55
AV MEAN GRADIENT: 8 MMHG
AV PEAK GRADIENT: 14 MMHG
AV VALVE AREA: 1.83 CM2
AV VELOCITY RATIO: 0.44
BASOPHILS # BLD AUTO: 0.01 K/UL (ref 0–0.2)
BASOPHILS NFR BLD: 0.1 % (ref 0–1.9)
BILIRUB SERPL-MCNC: 1.3 MG/DL (ref 0.1–1)
BSA FOR ECHO PROCEDURE: 2.24 M2
BUN SERPL-MCNC: 17 MG/DL (ref 8–23)
BUN SERPL-MCNC: 18 MG/DL (ref 8–23)
CALCIUM SERPL-MCNC: 8.8 MG/DL (ref 8.7–10.5)
CALCIUM SERPL-MCNC: 8.9 MG/DL (ref 8.7–10.5)
CHLORIDE SERPL-SCNC: 104 MMOL/L (ref 95–110)
CHLORIDE SERPL-SCNC: 105 MMOL/L (ref 95–110)
CO2 SERPL-SCNC: 24 MMOL/L (ref 23–29)
CO2 SERPL-SCNC: 27 MMOL/L (ref 23–29)
CREAT SERPL-MCNC: 1.2 MG/DL (ref 0.5–1.4)
CREAT SERPL-MCNC: 1.3 MG/DL (ref 0.5–1.4)
CV ECHO LV RWT: 0.9 CM
DIFFERENTIAL METHOD: ABNORMAL
DOP CALC AO PEAK VEL: 1.85 M/S
DOP CALC AO VTI: 36.37 CM
DOP CALC LVOT AREA: 3.3 CM2
DOP CALC LVOT DIAMETER: 2.06 CM
DOP CALC LVOT PEAK VEL: 0.82 M/S
DOP CALC LVOT STROKE VOLUME: 66.56 CM3
DOP CALCLVOT PEAK VEL VTI: 19.98 CM
ECHO LV POSTERIOR WALL: 1.49 CM (ref 0.6–1.1)
EOSINOPHIL # BLD AUTO: 0.1 K/UL (ref 0–0.5)
EOSINOPHIL NFR BLD: 1.2 % (ref 0–8)
ERYTHROCYTE [DISTWIDTH] IN BLOOD BY AUTOMATED COUNT: 14.1 % (ref 11.5–14.5)
EST. GFR  (AFRICAN AMERICAN): 58 ML/MIN/1.73 M^2
EST. GFR  (AFRICAN AMERICAN): >60 ML/MIN/1.73 M^2
EST. GFR  (NON AFRICAN AMERICAN): 50 ML/MIN/1.73 M^2
EST. GFR  (NON AFRICAN AMERICAN): 56 ML/MIN/1.73 M^2
FRACTIONAL SHORTENING: 19 % (ref 28–44)
GLUCOSE SERPL-MCNC: 169 MG/DL (ref 70–110)
GLUCOSE SERPL-MCNC: 174 MG/DL (ref 70–110)
HCT VFR BLD AUTO: 48.2 % (ref 40–54)
HGB BLD-MCNC: 15.5 G/DL (ref 14–18)
INR PPP: 1.1 (ref 0.8–1.2)
INTERVENTRICULAR SEPTUM: 1.68 CM (ref 0.6–1.1)
IVC OSTIUM: 2.2 CM
IVRT: 0.18 MSEC
LA MAJOR: 6.29 CM
LA MINOR: 6.6 CM
LA WIDTH: 2.48 CM
LEFT ATRIUM SIZE: 2.89 CM
LEFT ATRIUM VOLUME INDEX: 17.8 ML/M2
LEFT ATRIUM VOLUME: 39.24 CM3
LEFT INTERNAL DIMENSION IN SYSTOLE: 2.66 CM (ref 2.1–4)
LEFT VENTRICLE DIASTOLIC VOLUME INDEX: 20.04 ML/M2
LEFT VENTRICLE DIASTOLIC VOLUME: 44.12 ML
LEFT VENTRICLE MASS INDEX: 89 G/M2
LEFT VENTRICLE SYSTOLIC VOLUME INDEX: 11.8 ML/M2
LEFT VENTRICLE SYSTOLIC VOLUME: 26.06 ML
LEFT VENTRICULAR INTERNAL DIMENSION IN DIASTOLE: 3.3 CM (ref 3.5–6)
LEFT VENTRICULAR MASS: 196.04 G
LYMPHOCYTES # BLD AUTO: 1 K/UL (ref 1–4.8)
LYMPHOCYTES NFR BLD: 13.9 % (ref 18–48)
MAGNESIUM SERPL-MCNC: 1.8 MG/DL (ref 1.6–2.6)
MCH RBC QN AUTO: 31.1 PG (ref 27–31)
MCHC RBC AUTO-ENTMCNC: 32.2 G/DL (ref 32–36)
MCV RBC AUTO: 97 FL (ref 82–98)
MONOCYTES # BLD AUTO: 0.6 K/UL (ref 0.3–1)
MONOCYTES NFR BLD: 8.3 % (ref 4–15)
NEUTROPHILS # BLD AUTO: 5.7 K/UL (ref 1.8–7.7)
NEUTROPHILS NFR BLD: 76.5 % (ref 38–73)
PLATELET # BLD AUTO: 209 K/UL (ref 150–350)
PMV BLD AUTO: 10.7 FL (ref 9.2–12.9)
POCT GLUCOSE: 162 MG/DL (ref 70–110)
POCT GLUCOSE: 167 MG/DL (ref 70–110)
POCT GLUCOSE: 193 MG/DL (ref 70–110)
POTASSIUM SERPL-SCNC: 3.7 MMOL/L (ref 3.5–5.1)
POTASSIUM SERPL-SCNC: 3.7 MMOL/L (ref 3.5–5.1)
PROT SERPL-MCNC: 7.8 G/DL (ref 6–8.4)
PROTHROMBIN TIME: 11.4 SEC (ref 9–12.5)
PULM VEIN S/D RATIO: 1.64
PV PEAK D VEL: 0.22 M/S
PV PEAK S VEL: 0.36 M/S
RA MAJOR: 4.9 CM
RA PRESSURE: 15 MMHG
RA WIDTH: 3.91 CM
RBC # BLD AUTO: 4.99 M/UL (ref 4.6–6.2)
SODIUM SERPL-SCNC: 143 MMOL/L (ref 136–145)
SODIUM SERPL-SCNC: 143 MMOL/L (ref 136–145)
TDI LATERAL: 0.13 M/S
TDI SEPTAL: 0.08 M/S
TDI: 0.11 M/S
TROPONIN I SERPL DL<=0.01 NG/ML-MCNC: 0.26 NG/ML (ref 0–0.03)
WBC # BLD AUTO: 7.39 K/UL (ref 3.9–12.7)

## 2019-12-03 PROCEDURE — 83735 ASSAY OF MAGNESIUM: CPT | Mod: HCNC

## 2019-12-03 PROCEDURE — 85025 COMPLETE CBC W/AUTO DIFF WBC: CPT | Mod: HCNC

## 2019-12-03 PROCEDURE — 36415 COLL VENOUS BLD VENIPUNCTURE: CPT | Mod: HCNC

## 2019-12-03 PROCEDURE — 85730 THROMBOPLASTIN TIME PARTIAL: CPT | Mod: HCNC

## 2019-12-03 PROCEDURE — 80053 COMPREHEN METABOLIC PANEL: CPT | Mod: HCNC

## 2019-12-03 PROCEDURE — 84484 ASSAY OF TROPONIN QUANT: CPT | Mod: HCNC

## 2019-12-03 PROCEDURE — 85610 PROTHROMBIN TIME: CPT | Mod: HCNC

## 2019-12-03 PROCEDURE — 85730 THROMBOPLASTIN TIME PARTIAL: CPT | Mod: 91,HCNC

## 2019-12-03 PROCEDURE — 11000001 HC ACUTE MED/SURG PRIVATE ROOM: Mod: HCNC

## 2019-12-03 PROCEDURE — 63600175 PHARM REV CODE 636 W HCPCS: Mod: HCNC | Performed by: STUDENT IN AN ORGANIZED HEALTH CARE EDUCATION/TRAINING PROGRAM

## 2019-12-03 PROCEDURE — 99900035 HC TECH TIME PER 15 MIN (STAT): Mod: HCNC

## 2019-12-03 PROCEDURE — 93005 ELECTROCARDIOGRAM TRACING: CPT | Mod: HCNC

## 2019-12-03 PROCEDURE — 25000003 PHARM REV CODE 250: Mod: HCNC | Performed by: STUDENT IN AN ORGANIZED HEALTH CARE EDUCATION/TRAINING PROGRAM

## 2019-12-03 PROCEDURE — 27000221 HC OXYGEN, UP TO 24 HOURS: Mod: HCNC

## 2019-12-03 PROCEDURE — 94761 N-INVAS EAR/PLS OXIMETRY MLT: CPT | Mod: HCNC

## 2019-12-03 PROCEDURE — 94660 CPAP INITIATION&MGMT: CPT | Mod: HCNC

## 2019-12-03 PROCEDURE — 80048 BASIC METABOLIC PNL TOTAL CA: CPT | Mod: HCNC

## 2019-12-03 RX ORDER — ASPIRIN 325 MG
325 TABLET ORAL ONCE
Status: COMPLETED | OUTPATIENT
Start: 2019-12-03 | End: 2019-12-03

## 2019-12-03 RX ORDER — METOPROLOL SUCCINATE 25 MG/1
25 TABLET, EXTENDED RELEASE ORAL DAILY
Status: DISCONTINUED | OUTPATIENT
Start: 2019-12-03 | End: 2019-12-04

## 2019-12-03 RX ORDER — ASPIRIN 81 MG/1
81 TABLET ORAL DAILY
Status: DISCONTINUED | OUTPATIENT
Start: 2019-12-03 | End: 2019-12-06 | Stop reason: HOSPADM

## 2019-12-03 RX ORDER — HEPARIN SODIUM,PORCINE/D5W 25000/250
12 INTRAVENOUS SOLUTION INTRAVENOUS CONTINUOUS
Status: DISCONTINUED | OUTPATIENT
Start: 2019-12-03 | End: 2019-12-03

## 2019-12-03 RX ORDER — HEPARIN SODIUM 10000 [USP'U]/100ML
12 INJECTION, SOLUTION INTRAVENOUS CONTINUOUS
Status: DISCONTINUED | OUTPATIENT
Start: 2019-12-03 | End: 2019-12-03

## 2019-12-03 RX ORDER — HEPARIN SODIUM 5000 [USP'U]/ML
5000 INJECTION, SOLUTION INTRAVENOUS; SUBCUTANEOUS EVERY 8 HOURS
Status: DISCONTINUED | OUTPATIENT
Start: 2019-12-03 | End: 2019-12-06 | Stop reason: HOSPADM

## 2019-12-03 RX ORDER — AMLODIPINE BESYLATE 5 MG/1
5 TABLET ORAL ONCE
Status: COMPLETED | OUTPATIENT
Start: 2019-12-03 | End: 2019-12-03

## 2019-12-03 RX ORDER — AMLODIPINE BESYLATE 5 MG/1
10 TABLET ORAL DAILY
Status: DISCONTINUED | OUTPATIENT
Start: 2019-12-04 | End: 2019-12-06 | Stop reason: HOSPADM

## 2019-12-03 RX ORDER — LISINOPRIL 20 MG/1
40 TABLET ORAL DAILY
Status: DISCONTINUED | OUTPATIENT
Start: 2019-12-04 | End: 2019-12-06 | Stop reason: HOSPADM

## 2019-12-03 RX ADMIN — FUROSEMIDE 40 MG: 10 INJECTION, SOLUTION INTRAVENOUS at 03:12

## 2019-12-03 RX ADMIN — FUROSEMIDE 40 MG: 10 INJECTION, SOLUTION INTRAVENOUS at 12:12

## 2019-12-03 RX ADMIN — FUROSEMIDE 40 MG: 10 INJECTION, SOLUTION INTRAVENOUS at 11:12

## 2019-12-03 RX ADMIN — AMLODIPINE BESYLATE 5 MG: 5 TABLET ORAL at 09:12

## 2019-12-03 RX ADMIN — ASPIRIN 325 MG ORAL TABLET 325 MG: 325 PILL ORAL at 03:12

## 2019-12-03 RX ADMIN — HEPARIN SODIUM 5000 UNITS: 5000 INJECTION, SOLUTION INTRAVENOUS; SUBCUTANEOUS at 11:12

## 2019-12-03 RX ADMIN — FUROSEMIDE 40 MG: 10 INJECTION, SOLUTION INTRAVENOUS at 09:12

## 2019-12-03 RX ADMIN — HEPARIN SODIUM AND DEXTROSE 12 UNITS/KG/HR: 10000; 5 INJECTION INTRAVENOUS at 03:12

## 2019-12-03 RX ADMIN — METOPROLOL SUCCINATE 25 MG: 25 TABLET, FILM COATED, EXTENDED RELEASE ORAL at 12:12

## 2019-12-03 RX ADMIN — SIMVASTATIN 20 MG: 20 TABLET, FILM COATED ORAL at 11:12

## 2019-12-03 RX ADMIN — AMLODIPINE BESYLATE 5 MG: 5 TABLET ORAL at 12:12

## 2019-12-03 RX ADMIN — LISINOPRIL 20 MG: 20 TABLET ORAL at 09:12

## 2019-12-03 RX ADMIN — ASPIRIN 81 MG: 81 TABLET, COATED ORAL at 09:12

## 2019-12-03 NOTE — ED NOTES
Admit team paged and informed nitro drip was withheld due to map of 115. Also notified pt's o2 sat is between 88-92% on 5L nasal cannula. Admit team gave verbal order to restart bipap at this time.

## 2019-12-03 NOTE — PROGRESS NOTES
"[facility] Internal Medicine Progress Note    Date of Admit: 2019      Subjective:      Raf Fine is a 84 y.o.  male who  has a past medical history of Diabetes mellitus, type 2, Hyperlipidemia, Hypertension, and Stroke.. The patient presented to [facility] on 2019 with a primary complaint of Shortness of Breath (c/o SOB on exertion that has been worsening for the past few weeks. Saw his pcp today and was told to come to the ER. )      Patient doing better overnight. Tolerated 3L NC off BiPAP but patients daughter preferred to leave him on overnight given comfort. He was started on nitroglycerin ggt to maintain goal -120 with success. Tn trended up without EKG changes aside from new RBBB. Discussed case with cards who believe this is demand 2/2 HTN. Giving home meds, weaning BiPAP and nitro . Patient diuresed ~3.5 L since admission     Objective:   Last 24 Hour Vital Signs:  BP  Min: 116/58  Max: 203/123  Temp  Av.8 °F (36.6 °C)  Min: 97.4 °F (36.3 °C)  Max: 98.2 °F (36.8 °C)  Pulse  Av.8  Min: 71  Max: 96  Resp  Av.6  Min: 16  Max: 38  SpO2  Av.4 %  Min: 74 %  Max: 98 %  Height  Av' 10.3" (178.6 cm)  Min: 5' 10" (177.8 cm)  Max: 5' 11" (180.3 cm)  Weight  Av.1 kg (227 lb 5.9 oz)  Min: 100.4 kg (221 lb 5.5 oz)  Max: 104.5 kg (230 lb 6.1 oz)  Body mass index is 30.87 kg/m².  I/O last 3 completed shifts:  In: 238.4 [I.V.:48.4; IV Piggyback:190]  Out: 1400 [Urine:1400]    Physical Examination:  Gen: awake alert, answering questions appropriately, BiPAP in place 10/5 at 40%  HEENT: NC AT PERRL EOMI, MMM  Neck: JVP ~6, no LAD  Cardiac: RRR  No m/r/g, bedside TTE with poor windows but appeared to have grossly decreased EF  Lungs: minimal crackles in bases, no wheezes, or rhonchi, on BiPAP 10/5 as above, no conversational dyspnea  Abd: soft NTND BS+   Ext: trace edema to shins bilaterally, no cyanosis or clubbing  Skin: warm dry no " rash    Laboratory:  Most Recent Data:  CBC:   Lab Results   Component Value Date    WBC 7.39 12/03/2019    HGB 15.5 12/03/2019    HCT 48.2 12/03/2019     12/03/2019    MCV 97 12/03/2019    RDW 14.1 12/03/2019       BMP:   Lab Results   Component Value Date     12/03/2019    K 3.7 12/03/2019     12/03/2019    CO2 27 12/03/2019    BUN 17 12/03/2019    CREATININE 1.3 12/03/2019     (H) 12/03/2019    CALCIUM 8.9 12/03/2019    MG 1.8 12/03/2019     LFTs:   Lab Results   Component Value Date    PROT 7.8 12/03/2019    ALBUMIN 3.2 (L) 12/03/2019    BILITOT 1.3 (H) 12/03/2019    AST 15 12/03/2019    ALKPHOS 87 12/03/2019    ALT 22 12/03/2019     Coags:   Lab Results   Component Value Date    INR 1.1 12/03/2019     FLP:   Lab Results   Component Value Date    CHOL 166 12/02/2019    HDL 42 12/02/2019    LDLCALC 102.8 12/02/2019    TRIG 106 12/02/2019    CHOLHDL 25.3 12/02/2019     DM:   Lab Results   Component Value Date    HGBA1C 7.1 (H) 12/02/2019    HGBA1C 8.0 (H) 06/18/2013    HGBA1C 8.8 (H) 05/17/2013    LDLCALC 102.8 12/02/2019    CREATININE 1.3 12/03/2019     Thyroid:   Lab Results   Component Value Date    TSH 0.776 04/15/2013     Anemia: No results found for: IRON, TIBC, FERRITIN, [id number], FOLATE  Cardiac:   Lab Results   Component Value Date    TROPONINI 0.262 (H) 12/03/2019    BNP 1,244 (H) 12/02/2019     Urinalysis:   Lab Results   Component Value Date    COLORU YELLOW 04/15/2013    SPECGRAV 1.016 04/15/2013    NITRITE Negative 04/15/2013    KETONESU Negative 04/15/2013    UROBILINOGEN 0.2 04/15/2013    WBCUA 2 04/15/2013       Trended Lab Data:  Recent Labs   Lab 12/02/19  1311 12/03/19  0104 12/03/19  0337 12/03/19  0339   WBC 6.98  --  7.39  --    HGB 17.2  --  15.5  --    HCT 53.7  --  48.2  --      --  209  --    MCV 97  --  97  --    RDW 14.2  --  14.1  --     143  --  143   K 4.2 3.7  --  3.7    105  --  104   CO2 25 24  --  27   BUN 16 18  --  17   CREATININE  1.3 1.2  --  1.3   * 169*  --  174*   PROT 9.0*  --   --  7.8   ALBUMIN 3.8  --   --  3.2*   BILITOT 1.4*  --   --  1.3*   AST 19  --   --  15   ALKPHOS 104  --   --  87   ALT 30  --   --  22       Trended Cardiac Data:  Recent Labs   Lab 12/02/19  1311 12/02/19  2134 12/03/19  0124 12/03/19  0337   TROPONINI 0.168* 0.262* 0.262* 0.262*   BNP 1,244*  --   --   --        Microbiology Data:  Flu negative  Procal negative    Other Results:  EKG (my interpretation): 1st degree AV block, LAE, RBBB  Radiology:  Imaging Results          X-Ray Chest AP Portable (Final result)  Result time 12/02/19 14:52:34    Final result by Deric Garcia MD (12/02/19 14:52:34)                 Impression:      Findings suggestive of pulmonary edema.      Electronically signed by: Deric Garcia MD  Date:    12/02/2019  Time:    14:52             Narrative:    EXAMINATION:  XR CHEST AP PORTABLE    CLINICAL HISTORY:  shortness of breath;    TECHNIQUE:  Single frontal view of the chest was performed.    COMPARISON:  02/25/2008    FINDINGS:  Moderate cardiomegaly similar to prior exam.  Atherosclerosis of the aortic arch.  Prominence of pulmonary vasculature.  Prominent interstitial lung markings with a lower lobe and bilateral predominance which may be seen with pulmonary edema or infectious process.  No pleural effusion.  No pneumothorax.  Degenerative changes of the spine.                                     Assessment:     Raf Fine is a 84 y.o. male with:  Patient Active Problem List    Diagnosis Date Noted    SOB (shortness of breath) 12/02/2019    Pulmonary edema 12/02/2019    Respiratory failure with hypoxia 12/02/2019    Congestive heart failure 12/02/2019    Diabetes mellitus, type II 05/27/2013    HBP (high blood pressure) 05/27/2013    Type II or unspecified type diabetes mellitus without mention of complication, not stated as uncontrolled 04/18/2013        Plan:     Acute Hypoxic Respiratory Failure  2/2 pulmonary edema  -likely 2/2 HTN emergency vs new onset HF  -progressive SOB due to suspected viral illness over past week that has gotten worse, no h/o HF  -BNP 1244, Tn 0.168,  EKG with new RBBB no ischemic changes noted  -place on BiPAP 10/5 50%, diurese with lasix 40 q8, nitro ggt for -120  -TTE in AM  -weaning BiPAP and transition to NC then to RA    URI  -recent productive cough and now with SOB, no recent sick contacts  -CXR with interstitial infiltrates (edema vs airspace disease)  -afebrile, nml WBC, tachycardic and tachypneic, likely viral  -will check procal, cover with Levaquin, check flu    NSTEMI  -endorses SOB, no CP  -EKG with no ischemic changes (RBBB new), Tn 0.168, BNP 1244  -likely demand in setting of profound HTN, Tn trended up to 0.262 x2, will repeat one more  -cards c/s for new onset HF (suspected)    DM2 with renal manifestations  -a1c  Not UTD in system, will repeat  -currently on januvia  -will cover with low dose SSI to see requirements and initiate long acting after 24 hours, qac/hs accuchecks    HTN, essential  -on norvasc 5 and lisinopril 20 that's typically controlled  -very elevated in ED with pulmonary edema and elevated BNP, despite compliance  -resumed home meds and started on nitro ggt to get MAPs from 135 to 110-120 overnight  -weaning nitroglycerin as tolerated, currently on 10mcg/min    HLD  -resume simvastatin 20  -lipid panel with chol 166, HDL 42,     Diet: Cardiac/Diabetic  Fluids: none  Ppx: heparin  Code: Full    Dispo: pending weaning of BiPAP and nitroglycerin, step down, f/u TTE and cards recs, likely discharge tomorrow    Code Status:         Long Hoang MD  \[facility]\"" Internal Medicine HO3    \[facility]\"" Medicine Hospitalist Pager numbers:   \[facility]\"" Hospitalist Medicine Team A (Radha/Cesilia): [phone]  \[facility]\"" Hospitalist Medicine Team B (Sherry/Zoya):  [phone]

## 2019-12-03 NOTE — ED NOTES
Pt inquiring about being taken off bipap and eating. Admit team paged. Received verbal order to begin to wean pt and keep o2 sat above 88%.

## 2019-12-03 NOTE — CONSULTS
[facility] Initial Consultation Note    Reason for Consultation: NSTEMI    HPI:  84 y.o.  male with a history of HTN, HLD, DMII, and CVA (1996- no residual deficits) who presented 12/2/19 with a shortness of breath for 1 week.      1 week ago, patient began experiencing shortness of breath and cough occasionally productive of yellow sputum.  He also had dyspnea on exertion that worsened 1 day PTA.  On day of admission, patient was at his PCP's office.  His PCP recommended he go to the ED due to tachpnea and hyopoxia.  In the ED, patient had SpO2 in the high 70's on room air.  He was given 60 IV lasix and put on bipap overnight.  Since then, he reports he no longer feels short of breath and is satting well on 3L NC.    Cardiology was consulted for increased troponin (0.168 -> 0.262 -> 0.262 -> 0.262), BNP 1244, cxr consistent with pulmonary edema, and EKG showing left axis deviation, 1st degree AV block, right bundle branch block, and left anterior fascicular block.      Current Hospital Medications  Prior to Admission medications    Medication Sig Start Date End Date Taking? Authorizing Provider   ACCU-CHEK EDITH PLUS TEST STRP Strp  11/25/19  Yes Historical Provider, MD   ACCU-CHESAVANA SOFTCLIX LANCETS Misc  11/25/19  Yes Historical Provider, MD   amLODIPine (NORVASC) 5 MG tablet TAKE 1 TABLET EVERY DAY 11/25/19  Yes Oral Alberts MD   aspirin (ECOTRIN) 81 MG EC tablet Take 81 mg by mouth 2 (two) times daily.   Yes Historical Provider, MD   B2/vits A,C,E/lut/zeaxanth/min (ICAPS ORAL) Take by mouth. 4/4/11  Yes Historical Provider, MD   glipizide-metformin (METAGLIP) 2.5-500 mg per tablet TAKE 1 TABLET TWICE DAILY 11/12/19  Yes Oral Alberts MD   lisinopril (PRINIVIL,ZESTRIL) 20 MG tablet TAKE 1 TABLET EVERY DAY 11/25/19  Yes Oral Alberts MD   simvastatin (ZOCOR) 20 MG tablet Take 1 tablet (20 mg total) by mouth every evening. 4/9/13  Yes Carmine Emmanuel MD   SITagliptin  "(JANUVIA) 100 MG Tab Take 100 mg by mouth once daily.   Yes Historical Provider, MD       Scheduled Meds:   amLODIPine  5 mg Oral Daily    aspirin  81 mg Oral Daily    furosemide  40 mg Intravenous Q8H    levoFLOXacin  750 mg Intravenous Q24H    lisinopril  20 mg Oral Daily    simvastatin  20 mg Oral QHS     Continuous Infusions:   heparin (porcine) in D5W 12 Units/kg/hr (12/03/19 0348)    nitroGLYCERIN Stopped (12/03/19 0911)     PRN: Dextrose 10% Bolus, Dextrose 10% Bolus, glucagon (human recombinant), glucose, glucose, heparin (PORCINE), heparin (PORCINE), insulin aspart U-100, sodium chloride 0.9%    VITAL SIGNS  BP (!) 151/83   Pulse 75   Temp 98 °F (36.7 °C) (Axillary)   Resp 20   Ht 5' 11" (1.803 m)   Wt 100.4 kg (221 lb 5.5 oz)   SpO2 96%   BMI 30.87 kg/m²     Intake/Output Summary (Last 24 hours) at 12/3/2019 1036  Last data filed at 12/3/2019 0600  Gross per 24 hour   Intake 238.37 ml   Output 1400 ml   Net -1161.63 ml       OBJECTIVE    Physical Exam  Temp:  [97.4 °F (36.3 °C)-98.2 °F (36.8 °C)] 98 °F (36.7 °C)  Pulse:  [71-96] 75  Resp:  [16-38] 20  SpO2:  [74 %-98 %] 96 %  BP: (116-203)/() 151/83    Gen: Patient awake and alert, in NAD  Eyes: Pupils equal and round.  Sclerae anicteric, noninjected conjunctivae.  HENT: NC/AT, nasal septum midline, MMM, OP clear and without exudates.  CV: Regular rhythm.  Normal S1, S2.  No murmurs, rub or gallop.  JVP normal.  Chest:  Symmetric chest wall expansion.  Good air movement.  No wheezes.  Bibasilar crackles present in the lower lung fields.  Abd:  Soft, Non-tender.  Non distended.  Normoactive bowel sounds, no rebound  Ext: +2 radial pulses, 1+ bilateral pretibial pitting edema present to the mid-shins, warm to touch  Skin: intact, no lesions or rashes noted.  No decubitus ulcer.  Neuro:  Moves all extremities grossly.  Tongue midline.  Psych: Appropriate affect    Lab Results  Recent Labs   Lab 12/03/19  0339      K 3.7    "   CO2 27   BUN 17   CREATININE 1.3   MG 1.8     Recent Labs   Lab 12/03/19  0337   TROPONINI 0.262*     Recent Labs   Lab 12/03/19  0337   WBC 7.39   RBC 4.99   HGB 15.5   HCT 48.2      MCV 97   MCH 31.1*   MCHC 32.2     Recent Labs   Lab 12/03/19  0337   INR 1.1   APTT 25.0  25.0       EKG :  NSR, left axis deviation, 1st degree AV block, right BBB, left anterior fascicular block, LVH       ASSESSMENT/PLAN  Mr. Raf Fine is an 84 y.o.  male with a history of HTN, HLD, DMII, and CVA who presented with 1 week of SOB, productive cough, and [place] found to have elevated BNP, troponin, and new conduction abnormalities on EKG likely 2/2 new onset heart failure from hypertensive cardiomyopathy.  Diuresing well.    -Will follow up TTE to determine HFrEF vs HFpEF  -Replace Mg > 2, K > 4  -Blood pressure control most important - continue amlodipine 5, lasix 40 IV TID, wean nitroglycerine drip in favor of PO meds  -Recommend increasing lisinopril to 20 BID (creatinine may rise 10-15%)  -Would hold off on beta blockers until outpatient follow up given conduction abnormalities  -Continue aspirin 81, simvastatin 20  -Recommend discontinuing heparin drip (troponin elevation likely related to stress from hypertension, not ischemia)  -Can add spironolactone to BP regimen if EF is <35%    Discussed the case with the fellow, Dr. Wilkerson, and attending, Dr. Caldwell.      Ahmet Li MD  [facility] Card[facility] Service

## 2019-12-03 NOTE — PLAN OF CARE
Pt lives with his spouse and had been independent prior to admit; Pt's spouse informed TN she has a large support system who can help pt at home as needed and provide transportation whenever needed.   TN gave pt d/c brochure and card and encouraged to call for any needs/concerns.     12/03/19 1054   Discharge Assessment   Assessment Type Discharge Planning Assessment   Confirmed/corrected address and phone number on facesheet? Yes   Assessment information obtained from? Patient;Caregiver   Expected Length of Stay (days) 2   Communicated expected length of stay with patient/caregiver yes   Prior to hospitilization cognitive status: Alert/Oriented   Prior to hospitalization functional status: Independent   Current cognitive status: Alert/Oriented   Current Functional Status: Needs Assistance   Lives With spouse   Able to Return to Prior Arrangements yes   Is patient able to care for self after discharge? Yes   Who are your caregiver(s) and their phone number(s)? Nevaeh (spouse) [phone]   Patient's perception of discharge disposition home or selfcare   Readmission Within the Last 30 Days no previous admission in last 30 days   Patient currently being followed by outpatient case management? No   Patient currently receives any other outside agency services? No   Equipment Currently Used at Home none   Do you have any problems affording any of your prescribed medications? No   Is the patient taking medications as prescribed? yes   Does the patient have transportation home? Yes   Transportation Anticipated family or friend will provide   Does the patient receive services at the [facility]madin Clinic? No   Discharge Plan A Home with family   Discharge Plan B Home Health   DME Needed Upon Discharge  none   Patient/Family in Agreement with Plan yes

## 2019-12-03 NOTE — ED NOTES
PT taken off bipap and placed on 4L nc. Pt is tolerating well. Respirations even and unlabored. O2 sat between 89-93%

## 2019-12-03 NOTE — NURSING TRANSFER
Nursing Transfer Note      12/3/2019     Transfer To: [id number]    Transfer via bed    Transfer with NC to O2, cardiac monitoring    Transported by rn, transport    Medicines sent: none to send    Chart send with patient: Yes    Notified: family at bedside    Handoff at bedside to Mandy

## 2019-12-03 NOTE — PROGRESS NOTES
"eICU Note    Subjective: SOB ; CHF likely; CXR wet, BNP high      Objective:BP (!) 177/82   Pulse 78   Temp 97.6 °F (36.4 °C) (Axillary)   Resp (!) 21   Ht 5' 10" (1.778 m)   Wt 104.5 kg (230 lb 6.1 oz) Comment: weighed in pcp's office pta  SpO2 97%   BMI 33.06 kg/m²     Data review done     Video review done - resting on NIV   Good Vt       Assessment:CHF  Less likely infection- PCT nl, influenza swab negative , WBC nl    Plan:  1 Bedside team has ordered NTG IV ; lasix ; condom catheter   2 RBBB   3 NIV    DVT prophylaxis heparin sq  GI prophylaxis na  Ventilator settings NIV 10/5    Communication with RN      "

## 2019-12-03 NOTE — PROGRESS NOTES
Pharmacist Renal Dose Adjustment Note    Raf Fine is a 84 y.o. male being treated with the medication levofloxacin    Patient Data:    Vital Signs (Most Recent):  Temp: 97.4 °F (36.3 °C) (12/02/19 1248)  Pulse: 77 (12/02/19 1901)  Resp: (!) 21 (12/02/19 1901)  BP: (!) 160/76 (12/02/19 1901)  SpO2: 97 % (12/02/19 1901)   Vital Signs (72h Range):  Temp:  [97.4 °F (36.3 °C)-97.6 °F (36.4 °C)]   Pulse:  [77-96]   Resp:  [18-32]   BP: (159-203)/()   SpO2:  [74 %-97 %]      Recent Labs   Lab 12/02/19  1311   CREATININE 1.3     Serum creatinine: 1.3 mg/dL 12/02/19 1311  Estimated creatinine clearance: 52.1 mL/min    Medication:levofloxacin dose: 500mg frequency daily will be changed to medication:levofloxacin dose:750mg frequency:daily    Pharmacist's Name: Madison Estrada  Pharmacist's Extension: [phone]

## 2019-12-04 LAB
ALBUMIN SERPL BCP-MCNC: 3.4 G/DL (ref 3.5–5.2)
ALP SERPL-CCNC: 96 U/L (ref 55–135)
ALT SERPL W/O P-5'-P-CCNC: 20 U/L (ref 10–44)
ANION GAP SERPL CALC-SCNC: 11 MMOL/L (ref 8–16)
APTT BLDCRRT: 25.6 SEC (ref 21–32)
AST SERPL-CCNC: 15 U/L (ref 10–40)
BASOPHILS # BLD AUTO: 0.01 K/UL (ref 0–0.2)
BASOPHILS NFR BLD: 0.1 % (ref 0–1.9)
BILIRUB SERPL-MCNC: 1.3 MG/DL (ref 0.1–1)
BUN SERPL-MCNC: 19 MG/DL (ref 8–23)
CALCIUM SERPL-MCNC: 9.4 MG/DL (ref 8.7–10.5)
CHLORIDE SERPL-SCNC: 98 MMOL/L (ref 95–110)
CO2 SERPL-SCNC: 34 MMOL/L (ref 23–29)
CREAT SERPL-MCNC: 1.5 MG/DL (ref 0.5–1.4)
DIFFERENTIAL METHOD: ABNORMAL
EOSINOPHIL # BLD AUTO: 0.1 K/UL (ref 0–0.5)
EOSINOPHIL NFR BLD: 1.7 % (ref 0–8)
ERYTHROCYTE [DISTWIDTH] IN BLOOD BY AUTOMATED COUNT: 13.9 % (ref 11.5–14.5)
EST. GFR  (AFRICAN AMERICAN): 49 ML/MIN/1.73 M^2
EST. GFR  (NON AFRICAN AMERICAN): 42 ML/MIN/1.73 M^2
GLUCOSE SERPL-MCNC: 192 MG/DL (ref 70–110)
HCT VFR BLD AUTO: 52.8 % (ref 40–54)
HGB BLD-MCNC: 17.2 G/DL (ref 14–18)
LYMPHOCYTES # BLD AUTO: 1.3 K/UL (ref 1–4.8)
LYMPHOCYTES NFR BLD: 17.1 % (ref 18–48)
MAGNESIUM SERPL-MCNC: 1.8 MG/DL (ref 1.6–2.6)
MCH RBC QN AUTO: 31.3 PG (ref 27–31)
MCHC RBC AUTO-ENTMCNC: 32.6 G/DL (ref 32–36)
MCV RBC AUTO: 96 FL (ref 82–98)
MONOCYTES # BLD AUTO: 0.7 K/UL (ref 0.3–1)
MONOCYTES NFR BLD: 8.9 % (ref 4–15)
NEUTROPHILS # BLD AUTO: 5.4 K/UL (ref 1.8–7.7)
NEUTROPHILS NFR BLD: 72.2 % (ref 38–73)
PLATELET # BLD AUTO: 221 K/UL (ref 150–350)
PMV BLD AUTO: 11.6 FL (ref 9.2–12.9)
POCT GLUCOSE: 174 MG/DL (ref 70–110)
POCT GLUCOSE: 178 MG/DL (ref 70–110)
POCT GLUCOSE: 231 MG/DL (ref 70–110)
POCT GLUCOSE: 238 MG/DL (ref 70–110)
POTASSIUM SERPL-SCNC: 4 MMOL/L (ref 3.5–5.1)
PROT SERPL-MCNC: 8.4 G/DL (ref 6–8.4)
RBC # BLD AUTO: 5.5 M/UL (ref 4.6–6.2)
SODIUM SERPL-SCNC: 143 MMOL/L (ref 136–145)
WBC # BLD AUTO: 7.55 K/UL (ref 3.9–12.7)

## 2019-12-04 PROCEDURE — 11000001 HC ACUTE MED/SURG PRIVATE ROOM: Mod: HCNC

## 2019-12-04 PROCEDURE — 63600175 PHARM REV CODE 636 W HCPCS: Mod: HCNC | Performed by: STUDENT IN AN ORGANIZED HEALTH CARE EDUCATION/TRAINING PROGRAM

## 2019-12-04 PROCEDURE — 80053 COMPREHEN METABOLIC PANEL: CPT | Mod: HCNC

## 2019-12-04 PROCEDURE — 97530 THERAPEUTIC ACTIVITIES: CPT | Mod: HCNC

## 2019-12-04 PROCEDURE — 94761 N-INVAS EAR/PLS OXIMETRY MLT: CPT | Mod: HCNC

## 2019-12-04 PROCEDURE — 25000003 PHARM REV CODE 250: Mod: HCNC | Performed by: STUDENT IN AN ORGANIZED HEALTH CARE EDUCATION/TRAINING PROGRAM

## 2019-12-04 PROCEDURE — 36415 COLL VENOUS BLD VENIPUNCTURE: CPT | Mod: HCNC

## 2019-12-04 PROCEDURE — 85730 THROMBOPLASTIN TIME PARTIAL: CPT | Mod: HCNC

## 2019-12-04 PROCEDURE — 97165 OT EVAL LOW COMPLEX 30 MIN: CPT | Mod: HCNC

## 2019-12-04 PROCEDURE — 27000221 HC OXYGEN, UP TO 24 HOURS: Mod: HCNC

## 2019-12-04 PROCEDURE — 94799 UNLISTED PULMONARY SVC/PX: CPT | Mod: HCNC

## 2019-12-04 PROCEDURE — 97162 PT EVAL MOD COMPLEX 30 MIN: CPT | Mod: HCNC

## 2019-12-04 PROCEDURE — 83735 ASSAY OF MAGNESIUM: CPT | Mod: HCNC

## 2019-12-04 PROCEDURE — 85025 COMPLETE CBC W/AUTO DIFF WBC: CPT | Mod: HCNC

## 2019-12-04 RX ORDER — METOPROLOL SUCCINATE 25 MG/1
25 TABLET, EXTENDED RELEASE ORAL ONCE
Status: COMPLETED | OUTPATIENT
Start: 2019-12-04 | End: 2019-12-04

## 2019-12-04 RX ORDER — METOPROLOL SUCCINATE 50 MG/1
50 TABLET, EXTENDED RELEASE ORAL DAILY
Status: DISCONTINUED | OUTPATIENT
Start: 2019-12-05 | End: 2019-12-06

## 2019-12-04 RX ORDER — FUROSEMIDE 10 MG/ML
40 INJECTION INTRAMUSCULAR; INTRAVENOUS 2 TIMES DAILY
Status: DISCONTINUED | OUTPATIENT
Start: 2019-12-04 | End: 2019-12-06

## 2019-12-04 RX ORDER — FUROSEMIDE 10 MG/ML
40 INJECTION INTRAMUSCULAR; INTRAVENOUS EVERY 12 HOURS
Status: DISCONTINUED | OUTPATIENT
Start: 2019-12-04 | End: 2019-12-04

## 2019-12-04 RX ADMIN — INSULIN ASPART 1 UNITS: 100 INJECTION, SOLUTION INTRAVENOUS; SUBCUTANEOUS at 09:12

## 2019-12-04 RX ADMIN — HEPARIN SODIUM 5000 UNITS: 5000 INJECTION, SOLUTION INTRAVENOUS; SUBCUTANEOUS at 02:12

## 2019-12-04 RX ADMIN — HEPARIN SODIUM 5000 UNITS: 5000 INJECTION, SOLUTION INTRAVENOUS; SUBCUTANEOUS at 09:12

## 2019-12-04 RX ADMIN — SIMVASTATIN 20 MG: 20 TABLET, FILM COATED ORAL at 09:12

## 2019-12-04 RX ADMIN — AMLODIPINE BESYLATE 10 MG: 5 TABLET ORAL at 09:12

## 2019-12-04 RX ADMIN — LISINOPRIL 40 MG: 20 TABLET ORAL at 10:12

## 2019-12-04 RX ADMIN — ASPIRIN 81 MG: 81 TABLET, COATED ORAL at 09:12

## 2019-12-04 RX ADMIN — FUROSEMIDE 40 MG: 10 INJECTION, SOLUTION INTRAVENOUS at 10:12

## 2019-12-04 RX ADMIN — INSULIN ASPART 2 UNITS: 100 INJECTION, SOLUTION INTRAVENOUS; SUBCUTANEOUS at 12:12

## 2019-12-04 RX ADMIN — METOPROLOL SUCCINATE 25 MG: 25 TABLET, FILM COATED, EXTENDED RELEASE ORAL at 10:12

## 2019-12-04 RX ADMIN — FUROSEMIDE 40 MG: 10 INJECTION, SOLUTION INTRAVENOUS at 07:12

## 2019-12-04 RX ADMIN — HEPARIN SODIUM 5000 UNITS: 5000 INJECTION, SOLUTION INTRAVENOUS; SUBCUTANEOUS at 05:12

## 2019-12-04 NOTE — PLAN OF CARE
Problem: Physical Therapy Goal  Goal: Physical Therapy Goal  Description  Goals to be met by: 2020     Patient will increase functional independence with mobility by performin. Supine to sit with Modified [place]  2. Sit to stand transfer with Modified [place]  3. Bed to chair transfer with Modified [place] using Rolling Walker  4. Gait  x 200 feet with Modified [place] using Rolling Walker.   5. Lower extremity exercise program x15 reps per handout, with supervision     Outcome: Ongoing, Progressing       PT initial evaluation completed. Plan of care and goals established and discussed with patient/wife. Pt c/ 02 88-91% at rest on 5L02; post gait 90ft c/ RW at CGA, pt de-sat to 80 required seated break and 2-3min recovery back to 90%    Discharge Recommendation: HHPT  DME Recommendation: Rolling Walker

## 2019-12-04 NOTE — PLAN OF CARE
Pt stepdown from ICU; TN visited with pt, spouse, and daughter in law and updated on plan of care. Tn discussed HF s/s, diet, meds, and pt responsibilities. Tn provided HF booklet and encouraged to review and write down questions. TN also informed of PCC and pt/family agreeable. TN notified Shaye in PCC and also informed of new HF diagnosis for continued education. Tn updated white board and will continue to follow.     12/04/19 1123   Discharge Reassessment   Assessment Type Discharge Planning Reassessment   Provided patient/caregiver education on the expected discharge date and the discharge plan Yes   Do you have any problems affording any of your prescribed medications? No   Discharge Plan A Home Health   Discharge Plan B Skilled Nursing Facility   DME Needed Upon Discharge    (tbd)   Patient choice form signed by patient/caregiver N/A   Can the patient answer the patient profile reliably? Yes, cognitively intact   How does the patient rate their overall health at the present time? Good   Describe the patient's ability to walk at the present time. Major restrictions/daily assistance from another person   How often would a person be available to care for the patient? Whenever needed   Number of comorbid conditions (as recorded on the chart) Five or more   During the past month, has the patient often been bothered by feeling down, depressed or hopeless? No   During the past month, has the patient often been bothered by little interest or pleasure in doing things? No      The patient is a 12y Female complaining of headache.

## 2019-12-04 NOTE — PT/OT/SLP EVAL
Physical Therapy Evaluation    Patient Name:  Raf Fine   MRN:  [id number]    Recommendations:     Discharge Recommendations:  home health PT, home health OT   Discharge Equipment Recommendations: walker, rolling   Barriers to discharge: impaired activity tolerance    Assessment:     Raf Fine is a 84 y.o. male admitted with a medical diagnosis of Respiratory failure with hypoxia.  He presents with the following impairments/functional limitations:  weakness, gait instability, impaired endurance, impaired balance, decreased lower extremity function, impaired cardiopulmonary response to activity, impaired functional mobilty. PT initial evaluation completed. Plan of care and goals established and discussed with patient/wife. Pt c/ 02 88-91% at rest on 5L02; post gait 90ft c/ RW at CGA, pt de-sat to 80 required seated break and 2-3min recovery back to 90%.    Rehab Prognosis: Good; patient would benefit from acute skilled PT services to address these deficits and reach maximum level of function.    Recent Surgery: * No surgery found *      Plan:     During this hospitalization, patient to be seen 5 x/week to address the identified rehab impairments via gait training, therapeutic activities, therapeutic exercises, neuromuscular re-education and progress toward the following goals:    · Plan of Care Expires:  12/04/19    Subjective     Chief Complaint: NA  Patient/Family Comments/goals: Pt agreed to PT POC  Pain/Comfort:  · Pain Rating 1: 0/10    Patients cultural, spiritual, [employer] conflicts given the current situation: no    Living Environment:  Pt lives c/ spouse in 1SH, 1STE  Prior to admission, patients level of function was Mod I c/out DME use.  Equipment used at home: crutches, axillary, cane, straight, walker, rolling, rollator.  DME owned (not currently used): none.  Upon discharge, patient will have assistance from spouse.    Objective:     Communicated with RNIvette prior to session.   Patient found HOB elevated with bed alarm, peripheral IV, oxygen, telemetry, Condom Catheter  upon PT entry to room.    General Precautions: Standard, fall   Orthopedic Precautions:N/A   Braces: N/A     Exams:  · Cognitive Exam:  Patient is oriented to Person, Place, Time and Situation  · Gross Motor Coordination:  mildy impaired c/ functional gait  · Postural Exam:  Patient presented with the following abnormalities:    · -       No postural abnormalities identified  · RLE ROM: WFL  · RLE Strength: 3+/5 groslly  · LLE ROM: WFL  · LLE Strength: 3+/5 grossly    Functional Mobility:  · Bed Mobility:     · Rolling Right: supervision  · Scooting: stand by assistance  · Supine to Sit: contact guard assistance  · Transfers:     · Sit to Stand:  contact guard assistance with rolling walker  · Bed to Chair: contact guard assistance with  rolling walker  using  Step Transfer  · Gait: 90ft c/ RW; pt demo NBOS and reduced cholo step lengths; VC + demo to increase step length; progress walker appropriately and safe navigations of turns  · Balance: dynamic gait- fair      Therapeutic Activities and Exercises:  PT Analyal completed c/ progressive mobility as detailed above.   Pt& spouse educated on PT POC/role  Post gait was transferred to chair and educated on OOB activity 1-3hrs      AM-PAC 6 CLICK MOBILITY  Total Score:19     Patient left up in chair with all lines intact, call button in reach, chair alarm on and RN notified.    GOALS:   Multidisciplinary Problems     Physical Therapy Goals        Problem: Physical Therapy Goal    Goal Priority Disciplines Outcome Goal Variances Interventions   Physical Therapy Goal     PT, PT/OT Ongoing, Progressing     Description:  Goals to be met by: 2020     Patient will increase functional independence with mobility by performin. Supine to sit with Modified [place]  2. Sit to stand transfer with Modified [place]  3. Bed to chair transfer with Modified [place] using  Rolling Walker  4. Gait  x 200 feet with Modified [place] using Rolling Walker.   5. 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

## 2019-12-04 NOTE — PROGRESS NOTES
[facility] Internal Medicine Progress Note    Date of Admit: 2019      Subjective:      Raf Fine is a 84 y.o.  male who  has a past medical history of Bundle branch block, trifascicular (12/3/2019), Diabetes mellitus, type 2, Hyperlipidemia, Hypertension, and Stroke.. The patient presented to [facility] on 2019 with a primary complaint of Shortness of Breath (c/o SOB on exertion that has been worsening for the past few weeks. Saw his pcp today and was told to come to the ER. )      Patient reports breathing at baseline, but still requiring 4L NC. This is improved from yesterday when patient required ventimask for adequate SpO2 at 10L. He continues to diurese well at 3L.     Objective:   Last 24 Hour Vital Signs:  BP  Min: 140/73  Max: 163/81  Temp  Av.7 °F (36.5 °C)  Min: 97.2 °F (36.2 °C)  Max: 98.3 °F (36.8 °C)  Pulse  Av.1  Min: 73  Max: 90  Resp  Av.4  Min: 16  Max: 37  SpO2  Av.1 %  Min: 84 %  Max: 97 %  Weight  Av.3 kg (216 lb 11.4 oz)  Min: 97.8 kg (215 lb 9.8 oz)  Max: 98.8 kg (217 lb 13 oz)  Body mass index is 30.07 kg/m².  I/O last 3 completed shifts:  In: 513.4 [P.O.:275; I.V.:48.4; IV Piggyback:190]  Out: 4450 [Urine:4450]    Physical Examination:  Gen: awake alert, answering questions appropriately, BiPAP in place 10/5 at 40%  HEENT: NC AT PERRL EOMI, MMM  Neck: JVP ~6, no LAD  Cardiac: RRR  No m/r/g,   Lungs: minimal crackles in bases, no wheezes, or rhonchi,   Abd: soft NTND BS+   Ext: trace edema to shins bilaterally, no cyanosis or clubbing  Skin: warm dry no rash    Laboratory:  Most Recent Data:  CBC:   Lab Results   Component Value Date    WBC 7.55 2019    HGB 17.2 2019    HCT 52.8 2019     2019    MCV 96 2019    RDW 13.9 2019       BMP:   Lab Results   Component Value Date     2019    K 4.0 2019    CL 98 2019    CO2 34 (H) 2019    BUN 19 2019     CREATININE 1.5 (H) 12/04/2019     (H) 12/04/2019    CALCIUM 9.4 12/04/2019    MG 1.8 12/04/2019     LFTs:   Lab Results   Component Value Date    PROT 8.4 12/04/2019    ALBUMIN 3.4 (L) 12/04/2019    BILITOT 1.3 (H) 12/04/2019    AST 15 12/04/2019    ALKPHOS 96 12/04/2019    ALT 20 12/04/2019     Coags:   Lab Results   Component Value Date    INR 1.1 12/03/2019     FLP:   Lab Results   Component Value Date    CHOL 166 12/02/2019    HDL 42 12/02/2019    LDLCALC 102.8 12/02/2019    TRIG 106 12/02/2019    CHOLHDL 25.3 12/02/2019     DM:   Lab Results   Component Value Date    HGBA1C 7.1 (H) 12/02/2019    HGBA1C 8.0 (H) 06/18/2013    HGBA1C 8.8 (H) 05/17/2013    LDLCALC 102.8 12/02/2019    CREATININE 1.5 (H) 12/04/2019     Thyroid:   Lab Results   Component Value Date    TSH 0.776 04/15/2013     Anemia: No results found for: IRON, TIBC, FERRITIN, [id number], FOLATE  Cardiac:   Lab Results   Component Value Date    TROPONINI 0.262 (H) 12/03/2019    BNP 1,244 (H) 12/02/2019     Urinalysis:   Lab Results   Component Value Date    COLORU YELLOW 04/15/2013    SPECGRAV 1.016 04/15/2013    NITRITE Negative 04/15/2013    KETONESU Negative 04/15/2013    UROBILINOGEN 0.2 04/15/2013    WBCUA 2 04/15/2013       Trended Lab Data:  Recent Labs   Lab 12/02/19  1311 12/03/19  0104 12/03/19  0337 12/03/19  0339 12/04/19  0555   WBC 6.98  --  7.39  --  7.55   HGB 17.2  --  15.5  --  17.2   HCT 53.7  --  48.2  --  52.8     --  209  --  221   MCV 97  --  97  --  96   RDW 14.2  --  14.1  --  13.9    143  --  143 143   K 4.2 3.7  --  3.7 4.0    105  --  104 98   CO2 25 24  --  27 34*   BUN 16 18  --  17 19   CREATININE 1.3 1.2  --  1.3 1.5*   * 169*  --  174* 192*   PROT 9.0*  --   --  7.8 8.4   ALBUMIN 3.8  --   --  3.2* 3.4*   BILITOT 1.4*  --   --  1.3* 1.3*   AST 19  --   --  15 15   ALKPHOS 104  --   --  87 96   ALT 30  --   --  22 20       Trended Cardiac Data:  Recent Labs   Lab 12/02/19  1311  12/02/19  2134 12/03/19  0124 12/03/19  0337   TROPONINI 0.168* 0.262* 0.262* 0.262*   BNP 1,244*  --   --   --        Microbiology Data:  Flu negative  Procal negative    Other Results:  EKG (my interpretation): 1st degree AV block, LAE, RBBB  Radiology:  Imaging Results          X-Ray Chest AP Portable (Final result)  Result time 12/02/19 14:52:34    Final result by Deric Garcia MD (12/02/19 14:52:34)                 Impression:      Findings suggestive of pulmonary edema.      Electronically signed by: Deric Garcia MD  Date:    12/02/2019  Time:    14:52             Narrative:    EXAMINATION:  XR CHEST AP PORTABLE    CLINICAL HISTORY:  shortness of breath;    TECHNIQUE:  Single frontal view of the chest was performed.    COMPARISON:  02/25/2008    FINDINGS:  Moderate cardiomegaly similar to prior exam.  Atherosclerosis of the aortic arch.  Prominence of pulmonary vasculature.  Prominent interstitial lung markings with a lower lobe and bilateral predominance which may be seen with pulmonary edema or infectious process.  No pleural effusion.  No pneumothorax.  Degenerative changes of the spine.                                     Assessment:     Raf Fine is a 84 y.o. male with:  Patient Active Problem List    Diagnosis Date Noted    Bundle branch block, trifascicular 12/03/2019    Class 1 obesity due to excess calories with serious comorbidity and body mass index (BMI) of 30.0 to 30.9 in adult 12/03/2019    Shortness of breath     SOB (shortness of breath) 12/02/2019    Pulmonary edema 12/02/2019    Respiratory failure with hypoxia 12/02/2019    Congestive heart failure 12/02/2019    Diabetes mellitus, type II 05/27/2013    HBP (high blood pressure) 05/27/2013    Type II or unspecified type diabetes mellitus without mention of complication, not stated as uncontrolled 04/18/2013        Plan:     Acute Hypoxic Respiratory Failure 2/2 new onset HFrEF (45%)  -likely 2/2 HTN emergency vs  new onset HF  -progressive SOB due to suspected viral illness over past week that has gotten worse, no h/o HF  -BNP 1244, Tn 0.168,  EKG with new RBBB no ischemic changes noted  -place on BiPAP 10/5 50%, diurese with lasix 40 q8, nitro ggt for -120  -TTE with mildly reduced EF  -weaning O2, currently on 4L NC, continue diuresis  -may need home O2, 50 pack year smoking history    URI  -recent productive cough and now with SOB, no recent sick contacts  -CXR with interstitial infiltrates (edema vs airspace disease)  -afebrile, nml WBC, tachycardic and tachypneic, likely viral  -procal negative, no signs of infections, discontinued levaquin    NSTEMI  -endorses SOB, no CP  -EKG with no ischemic changes (RBBB new), Tn 0.168, BNP 1244  -likely demand in setting of profound HTN, Tn trended up to 0.262 x2, will repeat one more  -cards c/s for new onset HF, started toprol XL 50, increased lisinopril, and getting diuresed    DM2 with renal manifestations  -a1c  Not UTD in system, will repeat  -currently on januvia  -will cover with low dose SSI to see requirements and initiate long acting after 24 hours, qac/hs accuchecks    HTN, essential  -on norvasc 5 and lisinopril 20 that's typically controlled  -very elevated in ED with pulmonary edema and elevated BNP, despite compliance  -resumed home meds and started on nitro ggt to get MAPs from 135 to 110-120 overnight  -increased norvasc and lisinopril, addition of metoprolol succ    HLD  -resume simvastatin 20  -lipid panel with chol 166, HDL 42,     Diet: Cardiac/Diabetic  Fluids: none  Ppx: heparin  Code: Full    Dispo: weaning O2, currently at 4L    Code Status:         Long Hoang MD  [facility] Internal Medicine HO3    [facility] Medicine Hospitalist Pager numbers:   [facility] Hospitalist Medicine Team A (Radha/Cesilia): [phone]  [facility] Hospitalist Medicine Team B (Sherry/Zoya):  [phone]

## 2019-12-04 NOTE — PROGRESS NOTES
"[facility] Progress Note    S  Pt seen and examined at bedside this AM, DANIELLEO. Good UOP. VSS. Denies any cardiac sxs.    Current Hospital Medications  Prior to Admission medications    Medication Sig Start Date End Date Taking? Authorizing Provider   ACCU-CHEK EDITH PLUS TEST STRP Strp  11/25/19  Yes Historical Provider, MD   ACCU-CHEK SOFTCLIX LANCETS Misc  11/25/19  Yes Historical Provider, MD   amLODIPine (NORVASC) 5 MG tablet TAKE 1 TABLET EVERY DAY 11/25/19  Yes Oral Alberts MD   aspirin (ECOTRIN) 81 MG EC tablet Take 81 mg by mouth 2 (two) times daily.   Yes Historical Provider, MD   B2/vits A,C,E/lut/zeaxanth/min (ICAPS ORAL) Take by mouth. 4/4/11  Yes Historical Provider, MD   glipizide-metformin (METAGLIP) 2.5-500 mg per tablet TAKE 1 TABLET TWICE DAILY 11/12/19  Yes Oral Alberts MD   lisinopril (PRINIVIL,ZESTRIL) 20 MG tablet TAKE 1 TABLET EVERY DAY 11/25/19  Yes Oral Alberts MD   simvastatin (ZOCOR) 20 MG tablet Take 1 tablet (20 mg total) by mouth every evening. 4/9/13  Yes Carmine Emmanuel MD   SITagliptin (JANUVIA) 100 MG Tab Take 100 mg by mouth once daily.   Yes Historical Provider, MD       Scheduled Meds:   amLODIPine  10 mg Oral Daily    aspirin  81 mg Oral Daily    furosemide  40 mg Intravenous BID    heparin (porcine)  5,000 Units Subcutaneous Q8H    lisinopril  40 mg Oral Daily    [START ON 12/5/2019] metoprolol succinate  50 mg Oral Daily    simvastatin  20 mg Oral QHS     Continuous Infusions:    PRN: Dextrose 10% Bolus, Dextrose 10% Bolus, glucagon (human recombinant), glucose, glucose, insulin aspart U-100, sodium chloride 0.9%    VITAL SIGNS  BP (!) 163/81 (BP Location: Right arm, Patient Position: Lying)   Pulse 87   Temp 97.7 °F (36.5 °C) (Oral)   Resp 17   Ht 5' 11" (1.803 m)   Wt 97.8 kg (215 lb 9.8 oz)   SpO2 (!) 92%   BMI 30.07 kg/m²     Intake/Output Summary (Last 24 hours) at 12/4/2019 1306  Last data filed at 12/4/2019 0523  Gross " per 24 hour   Intake 275 ml   Output 1950 ml   Net -1675 ml       OBJECTIVE    Physical Exam  Temp:  [97.2 °F (36.2 °C)-98.3 °F (36.8 °C)] 97.7 °F (36.5 °C)  Pulse:  [78-87] 87  Resp:  [17-21] 17  SpO2:  [84 %-92 %] 92 %  BP: (140-163)/(68-83) 163/81     Review of Systems:  Pertinent items are noted in HPI. All other systems are reviewed and are negative.  Denies     Gen: Patient awake and alert, in NAD  Eyes: Pupils equal and round.  Sclerae anicteric, noninjected conjunctivae.  HENT: NC/AT, nasal septum midline, MMM, OP clear and without exudates.  CV: Regular rhythm.  Normal S1, S2.  No murmurs, rub or gallop.  JVP normal.  Chest:  Symmetric chest wall expansion.  Good air movement.  No wheezes.  Bibasilar crackles present in the lower lung fields.  Abd:  Soft, Non-tender.  Non distended.  Normoactive bowel sounds, no rebound  Ext: +2 radial pulses, 1+ bilateral pretibial pitting edema present to the mid-shins, warm to touch  Skin: intact, no lesions or rashes noted.  No decubitus ulcer.  Neuro:  Moves all extremities grossly.  Tongue midline.  Psych: Appropriate affect    Lab Results  Recent Labs   Lab 12/04/19  0555      K 4.0   CL 98   CO2 34*   BUN 19   CREATININE 1.5*   MG 1.8     No results for input(s): CPK, CPKMB, TROPONINI, MB in the last 24 hours.  Recent Labs   Lab 12/04/19  0555   WBC 7.55   RBC 5.50   HGB 17.2   HCT 52.8      MCV 96   MCH 31.3*   MCHC 32.6     Recent Labs   Lab 12/04/19  0555   APTT 25.6       EKG :  NSR, left axis deviation, 1st degree AV block, right BBB, left anterior fascicular block, LVH       ASSESSMENT/PLAN  Mr. Raf Fine is an 84 y.o.  male with a history of HTN, HLD, DMII, and CVA who presented with 1 week of SOB, productive cough, and [place] found to have elevated BNP, troponin, and new conduction abnormalities on EKG likely 2/2 new onset heart failure from hypertensive cardiomyopathy.     1. CHF   LVEF of ~45% (difficult study due to presence of  conduction abnormality).     Plan  -Goals of therapy should be to control blood pressure to a systolic BP < 120 mm Hg  -Switch Toprol to Coreg 6.25 mg BID for HR and BP control  -Replace Mg > 2, K > 4  - continue amlodipine 10 mg qd, lisinopril 40 mg qd, lasix 40 IV BID (creatinine may rise 10-15%) and will considering transitioning to PO lasix pending volume status  -Continue aspirin 81, simvastatin 20    2. Abnormal EKG:    Patient has finding of bifascicular block plus 1st degree AV block(trifascicular block)  He has some aortic annular calcification which is likely related to this finding.  No history of syncope       Ernie Gibbs MD  [facility]

## 2019-12-04 NOTE — PLAN OF CARE
Reported no pain, family at bed side.  2100 accu ck 193, no coverage.     Tele: SR,  HR 80,  No alarms.     Bed in lowest position, wheels locked, non skid socks, ID band worn, personal items and call bell with in reach, bed alarm set.

## 2019-12-04 NOTE — PLAN OF CARE
Problem: Occupational Therapy Goal  Goal: Occupational Therapy Goal  Description  Goals to be met by: 1/4/20     Patient will increase functional independence with ADLs by performing:    LE Dressing with Supervision.  Grooming while standing with Supervision.  Toileting from toilet with Supervision for hygiene and clothing management.   Supine to sit with Supervision.  Step transfer with Supervision  Toilet transfer to toilet with Supervision.  Increased functional strength to WFL for self care skills and functional mobility.  Upper extremity exercise program x10 reps per handout, with independence.     Outcome: Ongoing, Progressing    Pt would benefit from cont OT services in order to maximize functional independence. Recommending HHOT/PT at d/c and shower chair. Pt's O2 dropping to 80% following ambulation trial

## 2019-12-04 NOTE — PT/OT/SLP EVAL
Occupational Therapy   Evaluation    Name: Raf Fine  MRN: [id number]  Admitting Diagnosis:  Respiratory failure with hypoxia      Recommendations:     Discharge Recommendations: home health PT, home health OT  Discharge Equipment Recommendations:  (access to RW, rollator, SPC, QC, and crutches )  Barriers to discharge:  None    Assessment:     Raf Fine is a 84 y.o. male with a medical diagnosis of Respiratory failure with hypoxia.  He presents with SOB, impaired ax tolerance and endurance. Performance deficits affecting function: weakness, impaired self care skills, impaired functional mobilty, impaired endurance, gait instability, impaired balance, impaired cardiopulmonary response to activity, decreased lower extremity function, decreased upper extremity function.      Pt would benefit from cont OT services in order to maximize functional independence. Recommending HHOT/PT at d/c and shower chair. Pt's O2 dropping to 80% following ambulation trial     Rehab Prognosis: Good; patient would benefit from acute skilled OT services to address these deficits and reach maximum level of function.       Plan:     Patient to be seen 5 x/week to address the above listed problems via self-care/home management, therapeutic activities, therapeutic exercises  · Plan of Care Expires: 01/04/20  · Plan of Care Reviewed with: patient, spouse    Subjective     Chief Complaint: none stated at this time   Patient/Family Comments/goals: return to PLOF     Occupational Profile:  Living Environment: pt lives with spouse in [facility], 1 step to enter, no rail, t/s combo with grab bar   Previous level of function: independent; did not use DME; does not drive; wife reports mostly sedentary   Equipment Used at Home:  none  Assistance upon Discharge: form spouse    Pain/Comfort:  · Pain Rating 1: 0/10    Patients cultural, spiritual, [employer]oration conflicts given the current situation:      Objective:     Communicated with: tomas prior to  session.     General Precautions: Standard, fall   Orthopedic Precautions:N/A   Braces: N/A     Occupational Performance:    Bed Mobility:    · Patient completed Scooting/Bridging with contact guard assistance  · Patient completed Supine to Sit with contact guard assistance    Functional Mobility/Transfers:  · Patient completed Sit <> Stand Transfer with contact guard assistance  with  rolling walker   · Patient completed Bed <> Chair Transfer using Step Transfer technique with contact guard assistance with rolling walker  · Functional Mobility: CGA with RW; cues for RW management, proximity to RW, and increasing step length RLE    Activities of Daily Living:  · Lower Body Dressing: total assistance      Cognitive/Visual Perceptual:  Cognitive/Psychosocial Skills:     -       Oriented to: Person, Place, Time and Situation   -       Follows Commands/attention:Follows multistep  commands  -       Communication: clear/fluent  -       Memory: No Deficits noted  -       Safety awareness/insight to disability: intact   -       Mood/Affect/Coping skills/emotional control: Appropriate to situation    Physical Exam:  Balance:    -       SBA/CGA   Postural examination/scapula alignment:    -       Rounded shoulders  Skin integrity: Visible skin intact  Edema:  None noted  Upper Extremity Range of Motion:   BUE WFL for pt's needs   Upper Extremity Strength:  BUE strength 4-/5 based on function     AMPAC 6 Click ADL:  AMPAC Total Score: 17    Treatment & Education:  Pt performing skills as above   O2 monitored throughout session; prior to axs pt 88%; pursed lip breathing performed and bringing up to 91% prior to OOB axs   Pt CGA for bed mobility, and functional stand from EOB   Ambulation performed with O2 donned; CGA and cues for RW management; O2 dropping to 80% following gait and return to chair; pursed lip breathing performed while seated and recovering to 93% after 2-3 min  Educated on impt of OOB axs; UE/LE therex while  seated and encouraged to participate inspiratory spirometer   Education:    Patient left up in chair with all lines intact, call button in reach, chair alarm on, nsg notified and spouse  present    GOALS:   Multidisciplinary Problems     Occupational Therapy Goals        Problem: Occupational Therapy Goal    Goal Priority Disciplines Outcome Interventions   Occupational Therapy Goal     OT, PT/OT Ongoing, Progressing    Description:  Goals to be met by: 1/4/20     Patient will increase functional independence with ADLs by performing:    LE Dressing with Supervision.  Grooming while standing with Supervision.  Toileting from toilet with Supervision for hygiene and clothing management.   Supine to sit with Supervision.  Step transfer with Supervision  Toilet transfer to toilet with Supervision.  Increased functional strength to [facility] for self care skills and functional mobility.  Upper extremity exercise program x10 reps per handout, with independence.                      History:     Past Medical History:   Diagnosis Date    Bundle branch block, trifascicular 12/3/2019    Diabetes mellitus, type 2     Hyperlipidemia     Hypertension     Stroke        Past Surgical History:   Procedure Laterality Date    APPENDECTOMY      FOOT SURGERY      HERNIA REPAIR      LEG SURGERY      PROSTATE SURGERY      TONSILLECTOMY         Time Tracking:     OT Date of Treatment: 12/04/19  OT Start Time: 1444  OT Stop Time: 1511  OT Total Time (min): 27 min    Billable Minutes:Evaluation 10  Therapeutic Activity 17    Haley Us OT  12/4/2019

## 2019-12-04 NOTE — PLAN OF CARE
Patient transferred from the ICU to the floor. Safety maintained. Telemetry monitoring initiated. Assistance provided as needed.

## 2019-12-05 LAB
ALBUMIN SERPL BCP-MCNC: 3.2 G/DL (ref 3.5–5.2)
ALP SERPL-CCNC: 84 U/L (ref 55–135)
ALT SERPL W/O P-5'-P-CCNC: 16 U/L (ref 10–44)
ANION GAP SERPL CALC-SCNC: 12 MMOL/L (ref 8–16)
APTT BLDCRRT: 27.9 SEC (ref 21–32)
AST SERPL-CCNC: 17 U/L (ref 10–40)
BASOPHILS # BLD AUTO: 0.01 K/UL (ref 0–0.2)
BASOPHILS NFR BLD: 0.1 % (ref 0–1.9)
BILIRUB SERPL-MCNC: 1 MG/DL (ref 0.1–1)
BUN SERPL-MCNC: 22 MG/DL (ref 8–23)
CALCIUM SERPL-MCNC: 9.2 MG/DL (ref 8.7–10.5)
CHLORIDE SERPL-SCNC: 95 MMOL/L (ref 95–110)
CO2 SERPL-SCNC: 32 MMOL/L (ref 23–29)
CREAT SERPL-MCNC: 1.4 MG/DL (ref 0.5–1.4)
DIFFERENTIAL METHOD: NORMAL
EOSINOPHIL # BLD AUTO: 0.2 K/UL (ref 0–0.5)
EOSINOPHIL NFR BLD: 3.3 % (ref 0–8)
ERYTHROCYTE [DISTWIDTH] IN BLOOD BY AUTOMATED COUNT: 13.6 % (ref 11.5–14.5)
EST. GFR  (AFRICAN AMERICAN): 53 ML/MIN/1.73 M^2
EST. GFR  (NON AFRICAN AMERICAN): 46 ML/MIN/1.73 M^2
GLUCOSE SERPL-MCNC: 201 MG/DL (ref 70–110)
HCT VFR BLD AUTO: 52.6 % (ref 40–54)
HGB BLD-MCNC: 17 G/DL (ref 14–18)
LYMPHOCYTES # BLD AUTO: 1.3 K/UL (ref 1–4.8)
LYMPHOCYTES NFR BLD: 18.3 % (ref 18–48)
MAGNESIUM SERPL-MCNC: 1.9 MG/DL (ref 1.6–2.6)
MCH RBC QN AUTO: 30.9 PG (ref 27–31)
MCHC RBC AUTO-ENTMCNC: 32.3 G/DL (ref 32–36)
MCV RBC AUTO: 96 FL (ref 82–98)
MONOCYTES # BLD AUTO: 0.7 K/UL (ref 0.3–1)
MONOCYTES NFR BLD: 9.4 % (ref 4–15)
NEUTROPHILS # BLD AUTO: 4.7 K/UL (ref 1.8–7.7)
NEUTROPHILS NFR BLD: 68.9 % (ref 38–73)
PLATELET # BLD AUTO: 225 K/UL (ref 150–350)
PMV BLD AUTO: 11.4 FL (ref 9.2–12.9)
POCT GLUCOSE: 117 MG/DL (ref 70–110)
POCT GLUCOSE: 199 MG/DL (ref 70–110)
POCT GLUCOSE: 205 MG/DL (ref 70–110)
POCT GLUCOSE: 240 MG/DL (ref 70–110)
POTASSIUM SERPL-SCNC: 3.4 MMOL/L (ref 3.5–5.1)
PROT SERPL-MCNC: 8.1 G/DL (ref 6–8.4)
RBC # BLD AUTO: 5.5 M/UL (ref 4.6–6.2)
SODIUM SERPL-SCNC: 139 MMOL/L (ref 136–145)
WBC # BLD AUTO: 6.89 K/UL (ref 3.9–12.7)

## 2019-12-05 PROCEDURE — 11000001 HC ACUTE MED/SURG PRIVATE ROOM: Mod: HCNC

## 2019-12-05 PROCEDURE — 80053 COMPREHEN METABOLIC PANEL: CPT | Mod: HCNC

## 2019-12-05 PROCEDURE — 85730 THROMBOPLASTIN TIME PARTIAL: CPT | Mod: HCNC

## 2019-12-05 PROCEDURE — 97110 THERAPEUTIC EXERCISES: CPT | Mod: HCNC

## 2019-12-05 PROCEDURE — 25000003 PHARM REV CODE 250: Mod: HCNC | Performed by: STUDENT IN AN ORGANIZED HEALTH CARE EDUCATION/TRAINING PROGRAM

## 2019-12-05 PROCEDURE — 63600175 PHARM REV CODE 636 W HCPCS: Mod: HCNC | Performed by: STUDENT IN AN ORGANIZED HEALTH CARE EDUCATION/TRAINING PROGRAM

## 2019-12-05 PROCEDURE — 94799 UNLISTED PULMONARY SVC/PX: CPT | Mod: HCNC

## 2019-12-05 PROCEDURE — 97535 SELF CARE MNGMENT TRAINING: CPT | Mod: HCNC

## 2019-12-05 PROCEDURE — 85025 COMPLETE CBC W/AUTO DIFF WBC: CPT | Mod: HCNC

## 2019-12-05 PROCEDURE — 83735 ASSAY OF MAGNESIUM: CPT | Mod: HCNC

## 2019-12-05 PROCEDURE — 94761 N-INVAS EAR/PLS OXIMETRY MLT: CPT | Mod: HCNC

## 2019-12-05 PROCEDURE — 36415 COLL VENOUS BLD VENIPUNCTURE: CPT | Mod: HCNC

## 2019-12-05 PROCEDURE — 97116 GAIT TRAINING THERAPY: CPT | Mod: HCNC

## 2019-12-05 PROCEDURE — 27000221 HC OXYGEN, UP TO 24 HOURS: Mod: HCNC

## 2019-12-05 RX ADMIN — LISINOPRIL 40 MG: 20 TABLET ORAL at 08:12

## 2019-12-05 RX ADMIN — FUROSEMIDE 40 MG: 10 INJECTION, SOLUTION INTRAVENOUS at 08:12

## 2019-12-05 RX ADMIN — SIMVASTATIN 20 MG: 20 TABLET, FILM COATED ORAL at 09:12

## 2019-12-05 RX ADMIN — INSULIN ASPART 1 UNITS: 100 INJECTION, SOLUTION INTRAVENOUS; SUBCUTANEOUS at 09:12

## 2019-12-05 RX ADMIN — INSULIN ASPART 2 UNITS: 100 INJECTION, SOLUTION INTRAVENOUS; SUBCUTANEOUS at 02:12

## 2019-12-05 RX ADMIN — SPIRONOLACTONE 12.5 MG: 25 TABLET, FILM COATED ORAL at 02:12

## 2019-12-05 RX ADMIN — AMLODIPINE BESYLATE 10 MG: 5 TABLET ORAL at 08:12

## 2019-12-05 RX ADMIN — HEPARIN SODIUM 5000 UNITS: 5000 INJECTION, SOLUTION INTRAVENOUS; SUBCUTANEOUS at 09:12

## 2019-12-05 RX ADMIN — ASPIRIN 81 MG: 81 TABLET, COATED ORAL at 09:12

## 2019-12-05 RX ADMIN — HEPARIN SODIUM 5000 UNITS: 5000 INJECTION, SOLUTION INTRAVENOUS; SUBCUTANEOUS at 02:12

## 2019-12-05 RX ADMIN — METOPROLOL SUCCINATE 50 MG: 50 TABLET, EXTENDED RELEASE ORAL at 08:12

## 2019-12-05 RX ADMIN — FUROSEMIDE 40 MG: 10 INJECTION, SOLUTION INTRAVENOUS at 05:12

## 2019-12-05 RX ADMIN — HEPARIN SODIUM 5000 UNITS: 5000 INJECTION, SOLUTION INTRAVENOUS; SUBCUTANEOUS at 05:12

## 2019-12-05 NOTE — PLAN OF CARE
Problem: Physical Therapy Goal  Goal: Physical Therapy Goal  Description  Goals to be met by: 2020     Patient will increase functional independence with mobility by performin. Supine to sit with Modified [place]  2. Sit to stand transfer with Modified [place]  3. Bed to chair transfer with Modified [place] using Rolling Walker  4. Gait  x 200 feet with Modified [place] using Rolling Walker.   5. Lower extremity exercise program x15 reps per handout, with supervision     Outcome: Ongoing, Progressing   Pt presented supine in bed, 2L O2 via NC, family member present. Bed mobility supine>sit EOB with CGA at LE instruction for sequencing and hand placement with good return demonstration.  Sit> stand with SBA with good technique.  Gait training x 200ft with SBA with RW, instruction for posture and RW with good return demonstration.  Pt transfer to [facility]  SBA and with verbal cuing for hand placement and positioning.  Pt had seated rests.  Ther ex: standing there ex 2 x 10 heel raises, mini squats and HF with RW and SBA.

## 2019-12-05 NOTE — PROGRESS NOTES
Chart reviewed. Patient is an 84 year old male admitted for shortness of breath of recent onset. He was noted to be hypertensive in the ER. His EKG's show RBBB, JO and 1st degree AV block with no pauses or high grade AV block noted; his troponins were minimally elevated but flat; his CXR showed increased heart size and increase in vasculature consistent with heart failure. His echocardiogram showed decrease in EF probably around 25% with at the most mild aortic stenosis and mild AI..Wonder if all this is from uncontrolled hypertension. In either case, he has diuresed well. Currently on ACE, beta  Blockers and diuretics along with amlodipine. May add spironolactone as well. Will be glad to followup as outpatient once discharged with plans on repeating his echo to see if there has been any improvement.

## 2019-12-05 NOTE — PROGRESS NOTES
Pharmacy New Medication Education    Patient and/or Caregiver ACCEPTED medication education.    Pharmacy has provided education on the name, indication, and possible side effects of the medication(s) prescribed, using teach-back method.     Learners of pharmacy medication education includes:  patient  The following medications have also been discussed, during this admission.     Current Facility-Administered Medications   Medication Frequency    amLODIPine tablet 10 mg Daily    aspirin EC tablet 81 mg Daily    dextrose 10% (D10W) Bolus PRN    dextrose 10% (D10W) Bolus PRN    furosemide injection 40 mg BID    glucagon (human recombinant) injection 1 mg PRN    glucose chewable tablet 16 g PRN    glucose chewable tablet 24 g PRN    heparin (porcine) injection 5,000 Units Q8H    insulin aspart U-100 pen 0-5 Units QID (AC + HS) PRN    lisinopril tablet 40 mg Daily    metoprolol succinate (TOPROL-XL) 24 hr tablet 50 mg Daily    simvastatin tablet 20 mg QHS    sodium chloride 0.9% flush 10 mL PRN          Thank you  Lukas Black, PharmD

## 2019-12-05 NOTE — PLAN OF CARE
VN rounds: VN cued into pt's room with pt's permission. Pt resting in bed. Fall risk protocol discussed with pt. VN instructed pt to call for assistance. Pt aware and agreeable. NAD noted. Allowed time for questions.  Will cont to be available and intervene as needed.     12/05/19 1437   Type of Frequent Check   Type Patient Rounds;Other (see comments)  (vn round)   Safety/Activity   Patient Rounds visualized patient;call light in patient/parent reach   Safety Promotion/Fall Prevention instructed to call staff for mobility;Fall Risk reviewed with patient/family   Activity Management up in chair   Pain/Comfort/Sleep   Preferred Pain Scale number (Numeric Rating Pain Scale)   Pain Rating (0-10): Rest 0   Sleep/Rest/Relaxation awake;no problem identified   Assessments (Pre/Post)   Level of Consciousness (AVPU) alert

## 2019-12-05 NOTE — PLAN OF CARE
Problem: Occupational Therapy Goal  Goal: Occupational Therapy Goal  Description  Goals to be met by: 1/4/20     Patient will increase functional independence with ADLs by performing:    LE Dressing with Supervision.  Grooming while standing with Supervision.  Toileting from toilet with Supervision for hygiene and clothing management.   Supine to sit with Supervision.  Step transfer with Supervision  Toilet transfer to toilet with Supervision.  Increased functional strength to WFL for self care skills and functional mobility.  Upper extremity exercise program x10 reps per handout, with independence.     Outcome: Ongoing, Progressing      Pt with increased ax tolerance noted this date and pt with no complaints of SOB, however, following axs, pt's O2 sats 77% on 3L O2. Pt requires increased time to recover to 91% and O2 bumped to 4L. Cont OT POC HHOT/PT  RW

## 2019-12-05 NOTE — PROGRESS NOTES
[facility] Internal Medicine Progress Note    Date of Admit: 2019      Subjective:      Raf Fine is a 84 y.o.  male who  has a past medical history of Bundle branch block, trifascicular (12/3/2019), Diabetes mellitus, type 2, Hyperlipidemia, Hypertension, and Stroke.. The patient presented to [facility] on 2019 with a primary complaint of Shortness of Breath (c/o SOB on exertion that has been worsening for the past few weeks. Saw his pcp today and was told to come to the ER. )      Patient reports being comfortable, no SOB or CP but still with O2 requirement of 4 L satting 91%. He continues to diurese well now with -4.5L. He reports using incentive spirometer frequently and is working with PT/OT.      Objective:   Last 24 Hour Vital Signs:  BP  Min: 131/75  Max: 168/88  Temp  Av °F (36.7 °C)  Min: 97.2 °F (36.2 °C)  Max: 99 °F (37.2 °C)  Pulse  Av.4  Min: 69  Max: 87  Resp  Av  Min: 16  Max: 20  SpO2  Av.8 %  Min: 90 %  Max: 94 %  Weight  Av kg (211 lb 10.3 oz)  Min: 96 kg (211 lb 10.3 oz)  Max: 96 kg (211 lb 10.3 oz)  Body mass index is 29.52 kg/m².  I/O last 3 completed shifts:  In: 525 [P.O.:525]  Out: 2350 [Urine:2350]    Physical Examination:  Gen: awake alert, answering questions appropriately, on NC 4L, no distress  HEENT: NC AT PERRL EOMI, MMM  Neck: JVP ~7, no LAD  Cardiac: RRR  No m/r/g,   Lungs: minimal crackles in bases, no wheezes, or rhonchi,   Abd: soft NTND BS+   Ext: trace edema to shins bilaterally, no cyanosis or clubbing  Skin: warm dry no rash    Laboratory:  Most Recent Data:  CBC:   Lab Results   Component Value Date    WBC 6.89 2019    HGB 17.0 2019    HCT 52.6 2019     2019    MCV 96 2019    RDW 13.6 2019       BMP:   Lab Results   Component Value Date     2019    K 4.0 2019    CL 98 2019    CO2 34 (H) 2019    BUN 19 2019    CREATININE 1.5 (H)  12/04/2019     (H) 12/04/2019    CALCIUM 9.4 12/04/2019    MG 1.8 12/04/2019     LFTs:   Lab Results   Component Value Date    PROT 8.4 12/04/2019    ALBUMIN 3.4 (L) 12/04/2019    BILITOT 1.3 (H) 12/04/2019    AST 15 12/04/2019    ALKPHOS 96 12/04/2019    ALT 20 12/04/2019     Coags:   Lab Results   Component Value Date    INR 1.1 12/03/2019     FLP:   Lab Results   Component Value Date    CHOL 166 12/02/2019    HDL 42 12/02/2019    LDLCALC 102.8 12/02/2019    TRIG 106 12/02/2019    CHOLHDL 25.3 12/02/2019     DM:   Lab Results   Component Value Date    HGBA1C 7.1 (H) 12/02/2019    HGBA1C 8.0 (H) 06/18/2013    HGBA1C 8.8 (H) 05/17/2013    LDLCALC 102.8 12/02/2019    CREATININE 1.5 (H) 12/04/2019     Thyroid:   Lab Results   Component Value Date    TSH 0.776 04/15/2013     Anemia: No results found for: IRON, TIBC, FERRITIN, IOABNDFZ15, FOLATE  Cardiac:   Lab Results   Component Value Date    TROPONINI 0.262 (H) 12/03/2019    BNP 1,244 (H) 12/02/2019     Urinalysis:   Lab Results   Component Value Date    COLORU YELLOW 04/15/2013    SPECGRAV 1.016 04/15/2013    NITRITE Negative 04/15/2013    KETONESU Negative 04/15/2013    UROBILINOGEN 0.2 04/15/2013    WBCUA 2 04/15/2013       Trended Lab Data:  Recent Labs   Lab 12/02/19  1311 12/03/19  0104 12/03/19  0337 12/03/19  0339 12/04/19  0555 12/05/19  0632   WBC 6.98  --  7.39  --  7.55 6.89   HGB 17.2  --  15.5  --  17.2 17.0   HCT 53.7  --  48.2  --  52.8 52.6     --  209  --  221 225   MCV 97  --  97  --  96 96   RDW 14.2  --  14.1  --  13.9 13.6    143  --  143 143  --    K 4.2 3.7  --  3.7 4.0  --     105  --  104 98  --    CO2 25 24  --  27 34*  --    BUN 16 18  --  17 19  --    CREATININE 1.3 1.2  --  1.3 1.5*  --    * 169*  --  174* 192*  --    PROT 9.0*  --   --  7.8 8.4  --    ALBUMIN 3.8  --   --  3.2* 3.4*  --    BILITOT 1.4*  --   --  1.3* 1.3*  --    AST 19  --   --  15 15  --    ALKPHOS 104  --   --  87 96  --    ALT 30  --    --  22 20  --        Trended Cardiac Data:  Recent Labs   Lab 12/02/19  1311 12/02/19  2134 12/03/19  0124 12/03/19  0337   TROPONINI 0.168* 0.262* 0.262* 0.262*   BNP 1,244*  --   --   --        Microbiology Data:  Flu negative  Procal negative    Other Results:  EKG (my interpretation): 1st degree AV block, LAE, RBBB  Radiology:  Imaging Results          X-Ray Chest AP Portable (Final result)  Result time 12/02/19 14:52:34    Final result by Deric Garcia MD (12/02/19 14:52:34)                 Impression:      Findings suggestive of pulmonary edema.      Electronically signed by: Deric Garcia MD  Date:    12/02/2019  Time:    14:52             Narrative:    EXAMINATION:  XR CHEST AP PORTABLE    CLINICAL HISTORY:  shortness of breath;    TECHNIQUE:  Single frontal view of the chest was performed.    COMPARISON:  02/25/2008    FINDINGS:  Moderate cardiomegaly similar to prior exam.  Atherosclerosis of the aortic arch.  Prominence of pulmonary vasculature.  Prominent interstitial lung markings with a lower lobe and bilateral predominance which may be seen with pulmonary edema or infectious process.  No pleural effusion.  No pneumothorax.  Degenerative changes of the spine.                                     Assessment:     Raf Fine is a 84 y.o. male with:  Patient Active Problem List    Diagnosis Date Noted    Bundle branch block, trifascicular 12/03/2019    Class 1 obesity due to excess calories with serious comorbidity and body mass index (BMI) of 30.0 to 30.9 in adult 12/03/2019    Shortness of breath     SOB (shortness of breath) 12/02/2019    Pulmonary edema 12/02/2019    Respiratory failure with hypoxia 12/02/2019    Congestive heart failure 12/02/2019    Diabetes mellitus, type II 05/27/2013    HBP (high blood pressure) 05/27/2013    Type II or unspecified type diabetes mellitus without mention of complication, not stated as uncontrolled 04/18/2013        Plan:     Acute Hypoxic  Respiratory Failure 2/2 new onset HFrEF (45%)  -likely 2/2 HTN emergency vs new onset HF  -progressive SOB due to suspected viral illness over past week that has gotten worse, no h/o HF  -BNP 1244, Tn 0.168,  EKG with new RBBB no ischemic changes noted  -required BiPAP 50% 10/5 and lasix 40q8, transitioned to PO 40 q12  -TTE with mildly reduced EF  -weaning O2, currently on 4L NC, continue diuresis  -may need home O2, 50 pack year smoking history    URI  -recent productive cough and now with SOB, no recent sick contacts  -CXR with interstitial infiltrates (edema vs airspace disease)  -afebrile, nml WBC, tachycardic and tachypneic, likely viral  -procal negative, no signs of infections, discontinued levaquin    NSTEMI  -endorses SOB, no CP  -EKG with no ischemic changes (RBBB new), Tn 0.168, BNP 1244  -likely demand in setting of profound HTN, Tn trended up to 0.262 x2  -cards c/s for new onset HF, started toprol XL 50, increased lisinopril, and getting diuresed (transitioned to 40 PO BID)    DM2 with renal manifestations  -a1c  Not UTD in system, will repeat  -currently on januvia  -will cover with low dose SSI to see requirements and initiate long acting after 24 hours, qac/hs accuchecks    HTN, essential  -on norvasc 5 and lisinopril 20 that's typically controlled  -very elevated in ED with pulmonary edema and elevated BNP, despite compliance  -resumed home meds and started on nitro ggt to get MAPs from 135 to 110-120 overnight  -increased norvasc and lisinopril, addition of metoprolol succ    HLD  -resume simvastatin 20  -lipid panel with chol 166, HDL 42,     Diet: Cardiac/Diabetic  Fluids: none  Ppx: heparin  Code: Full    Dispo: weaning O2, currently at 4L    Code Status:         Long Hoang MD  [facility] Internal Medicine HO3    [facility] Medicine Hospitalist Pager numbers:   [facility] Hospitalist Medicine Team A (Radha/Cesilia): [phone]  [facility] Hospitalist Medicine Team B (Sherry/Zoya):   [phone]

## 2019-12-05 NOTE — PROGRESS NOTES
"[facility]ARDIOLOG[facility] Progress Note    S  Pt seen and examined at bedside this AM, NAEO. Good UOP. VSS. Denies any cardiac sxs.    Current Hospital Medications  Prior to Admission medications    Medication Sig Start Date End Date Taking? Authorizing Provider   ACCU-CHEK EDITH PLUS TEST STRP Strp  11/25/19  Yes Historical Provider, MD   ACCU-CHEK SOFTCLIX LANCETS Misc  11/25/19  Yes Historical Provider, MD   amLODIPine (NORVASC) 5 MG tablet TAKE 1 TABLET EVERY DAY 11/25/19  Yes Oral Alberts MD   aspirin (ECOTRIN) 81 MG EC tablet Take 81 mg by mouth 2 (two) times daily.   Yes Historical Provider, MD   B2/vits A,C,E/lut/zeaxanth/min (ICAPS ORAL) Take by mouth. 4/4/11  Yes Historical Provider, MD   glipizide-metformin (METAGLIP) 2.5-500 mg per tablet TAKE 1 TABLET TWICE DAILY 11/12/19  Yes Oral Alberts MD   lisinopril (PRINIVIL,ZESTRIL) 20 MG tablet TAKE 1 TABLET EVERY DAY 11/25/19  Yes Oral Alberts MD   simvastatin (ZOCOR) 20 MG tablet Take 1 tablet (20 mg total) by mouth every evening. 4/9/13  Yes Carmine Emmanuel MD   SITagliptin (JANUVIA) 100 MG Tab Take 100 mg by mouth once daily.   Yes Historical Provider, MD       Scheduled Meds:   amLODIPine  10 mg Oral Daily    aspirin  81 mg Oral Daily    furosemide  40 mg Intravenous BID    heparin (porcine)  5,000 Units Subcutaneous Q8H    lisinopril  40 mg Oral Daily    metoprolol succinate  50 mg Oral Daily    simvastatin  20 mg Oral QHS     Continuous Infusions:    PRN: Dextrose 10% Bolus, Dextrose 10% Bolus, glucagon (human recombinant), glucose, glucose, insulin aspart U-100, sodium chloride 0.9%    VITAL SIGNS  BP (!) 144/81 (BP Location: Left arm, Patient Position: Lying)   Pulse 74   Temp 97.9 °F (36.6 °C) (Oral)   Resp 18   Ht 5' 11" (1.803 m)   Wt 96 kg (211 lb 10.3 oz)   SpO2 (!) 92%   BMI 29.52 kg/m²     Intake/Output Summary (Last 24 hours) at 12/5/2019 1042  Last data filed at 12/4/2019 2341  Gross per 24 hour   Intake " 250 ml   Output 900 ml   Net -650 ml       OBJECTIVE    Physical Exam  Temp:  [97.2 °F (36.2 °C)-99 °F (37.2 °C)] 97.9 °F (36.6 °C)  Pulse:  [69-87] 74  Resp:  [16-20] 18  SpO2:  [90 %-94 %] 92 %  BP: (131-168)/(73-88) 144/81     Review of Systems:  Pertinent items are noted in HPI. All other systems are reviewed and are negative.  Denies     Gen: Patient awake and alert, in NAD  Eyes: Pupils equal and round.  Sclerae anicteric, noninjected conjunctivae.  HENT: NC/AT, nasal septum midline, MMM, OP clear and without exudates.  CV: Regular rhythm.  Normal S1, S2.  No murmurs, rub or gallop.  JVP normal.  Chest:  Symmetric chest wall expansion.  Good air movement.  No wheezes.  Bibasilar crackles present in the lower lung fields.  Abd:  Soft, Non-tender.  Non distended.  Normoactive bowel sounds, no rebound  Ext: +2 radial pulses, 1+ bilateral pretibial pitting edema present to the mid-shins, warm to touch  Skin: intact, no lesions or rashes noted.  No decubitus ulcer.  Neuro:  Moves all extremities grossly.  Tongue midline.  Psych: Appropriate affect    Lab Results  Recent Labs   Lab 12/05/19  0632      K 3.4*   CL 95   CO2 32*   BUN 22   CREATININE 1.4   MG 1.9     No results for input(s): CPK, CPKMB, TROPONINI, MB in the last 24 hours.  Recent Labs   Lab 12/05/19  0632   WBC 6.89   RBC 5.50   HGB 17.0   HCT 52.6      MCV 96   MCH 30.9   MCHC 32.3     Recent Labs   Lab 12/05/19  0632   APTT 27.9       EKG :  NSR, left axis deviation, 1st degree AV block, right BBB, left anterior fascicular block, LVH       ASSESSMENT/PLAN  Mr. Raf Fine is an 84 y.o.  male with a history of HTN, HLD, DMII, and CVA who presented with 1 week of SOB, productive cough, and [place] found to have elevated BNP, troponin, and new conduction abnormalities on EKG likely 2/2 new onset heart failure from hypertensive cardiomyopathy.     1. CHF   LVEF of ~45% (difficult study due to presence of conduction abnormality).      Plan  -Goals of therapy should be to control blood pressure to a systolic BP < 120 mm Hg  -Switch Toprol to Coreg 6.25 mg BID for HR and BP control  -Replace Mg > 2, K > 4  - continue amlodipine 10 mg qd, lisinopril 40 mg qd  - transition lasix 40 IV to lasix 80 m qd PO  -Continue aspirin 81, simvastatin 20    2. Abnormal EKG:    Patient has finding of bifascicular block plus 1st degree AV block(trifascicular block)  He has some aortic annular calcification which is likely related to this finding.  No history of syncope       Ernie Gibbs MD  [facility]

## 2019-12-05 NOTE — PT/OT/SLP PROGRESS
Physical Therapy Treatment    Patient Name:  Raf Fine   MRN:  [id number]    Recommendations:     Discharge Recommendations:  home health OT, home health PT   Discharge Equipment Recommendations: none   Barriers to discharge: None    Assessment:     Raf Fine is a 84 y.o. male admitted with a medical diagnosis of Respiratory failure with hypoxia.  He presents with the following impairments/functional limitations:  impaired endurance, impaired self care skills, impaired functional mobilty, gait instability, impaired balance, decreased coordination, impaired cardiopulmonary response to activity, decreased lower extremity function, decreased upper extremity function .  Pt continues with functional mobility deficits and should benefit from continuing current PT POC to maximize I and safety decrease risk of further decline of injury.    Rehab Prognosis: Good; patient would benefit from acute skilled PT services to address these deficits and reach maximum level of function.    Recent Surgery: * No surgery found *      Plan:     During this hospitalization, patient to be seen 5 x/week to address the identified rehab impairments via therapeutic activities, therapeutic exercises, neuromuscular re-education and progress toward the following goals:    · Plan of Care Expires:  01/04/20    Subjective     Chief Complaint: pt without c/o  Patient/Family Comments/goals: hoping to go home today  Pain/Comfort:  Pain Rating 1: 0/10      Objective:     Communicated with nurse prior to session.  Patient found supine with bed alarm, Condom Catheter, telemetry, peripheral IV upon PT entry to room.     General Precautions: Standard, airborne   Orthopedic Precautions:N/A   Braces: N/A     Functional Mobility:  · Bed Mobility:     · Supine to Sit: contact guard assistance  · Transfers:     · Sit to Stand:  stand by assistance with rolling walker  · Bed to Chair: stand by assistance with  rolling walker  using  Step  Transfer  Gait training x 200ft with SBA with RW, instruction for posture and RW with good return demonstration.      AM-PAC 6 CLICK MOBILITY  Turning over in bed (including adjusting bedclothes, sheets and blankets)?: 4  Sitting down on and standing up from a chair with arms (e.g., wheelchair, bedside commode, etc.): 3  Moving from lying on back to sitting on the side of the bed?: 3  Moving to and from a bed to a chair (including a wheelchair)?: 3  Need to walk in hospital room?: 3  Climbing 3-5 steps with a railing?: 3  Basic Mobility Total Score: 19       Therapeutic Activities and Exercises:   Pt presented supine in bed, 2L O2 via NC, family member present. Bed mobility supine>sit EOB with CGA at LE instruction for sequencing and hand placement with good return demonstration.  Sit> stand with SBA with good technique.  Gait training x 200ft with SBA with RW, instruction for posture and RW with good return demonstration.  Pt transfer to [facility]  SBA and with verbal cuing for hand placement and positioning.  Pt had seated rests.  Ther ex: standing there ex 2 x 10 heel raises, mini squats and HF with RW and SBA.      Patient left up in chair with all lines intact, call button in reach, chair alarm on, nursing notified and family present..    GOALS:   Multidisciplinary Problems     Physical Therapy Goals        Problem: Physical Therapy Goal    Goal Priority Disciplines Outcome Goal Variances Interventions   Physical Therapy Goal     PT, PT/OT Ongoing, Progressing     Description:  Goals to be met by: 2020     Patient will increase functional independence with mobility by performin. Supine to sit with Modified [place]  2. Sit to stand transfer with Modified [place]  3. Bed to chair transfer with Modified [place] using Rolling Walker  4. Gait  x 200 feet with Modified [place] using Rolling Walker.   5. Lower extremity exercise program x15 reps per handout, with supervision                       Time Tracking:     PT Received On: 12/05/19  PT Start Time: 1333     PT Stop Time: 1359  PT Total Time (min): 26 min     Billable Minutes: Gait Training 12 and Therapeutic Exercise 14    Treatment Type: Treatment  PT/PTA: PT           Miles Vasquez, PT  12/05/2019

## 2019-12-05 NOTE — PLAN OF CARE
Pt AAO x 4.  VSS.  Pt remained afebrile throughout this shift.   Pt remained free of falls this shift.   Pt c/o pain this shift.  PRN analgesics administered as ordered.   Plan of care reviewed. Patient verbalizes understanding.   Bed low, side rails up x 3, wheels locked, call light in reach.   Applied compression stocking.  Bed alarm maintained for safety.   Patient instructed to call for assistance.   Hourly rounding completed.   Will continue to monitor.

## 2019-12-05 NOTE — PLAN OF CARE
POC reviewed with the pt and family, verbalized understanding.  VSS.  AAOx3.  No complaint of pain or any other distress noted throughout night.  PIV remained intact.  On 5L oxygen via NC, O2 sat >90% throughout night.  On continuous telemetry monitor, SR.  No ectopy noted.  Blood glucose monitored, insulin given as ordered.  Condom catheter changed.  Encouraged to turn frequently.  Safety maintained, free of falls throughout shift.  Instructed to call for any assistance.  Will continue to monitor.

## 2019-12-05 NOTE — PT/OT/SLP PROGRESS
Occupational Therapy   Treatment    Name: Raf Fine  MRN: [id number]  Admitting Diagnosis:  Respiratory failure with hypoxia       Recommendations:     Discharge Recommendations: home health PT, home health OT  Discharge Equipment Recommendations:  walker, rolling, shower chair  Barriers to discharge:  None    Assessment:     Raf Fine is a 84 y.o. male with a medical diagnosis of Respiratory failure with hypoxia.  He presents with impaired respiratory status . Performance deficits affecting function are weakness, impaired self care skills, impaired balance, impaired endurance, gait instability, impaired functional mobilty, impaired cardiopulmonary response to activity, decreased lower extremity function, decreased upper extremity function, decreased safety awareness.     Pt with increased ax tolerance noted this date and pt with no complaints of SOB, however, following axs, pt's O2 sats 77% on 3L O2. Pt requires increased time to recover to 91% and O2 bumped to 4L. Cont OT POC HHOT/PT  RW     Rehab Prognosis:  Good; patient would benefit from acute skilled OT services to address these deficits and reach maximum level of function.       Plan:     Patient to be seen 5 x/week to address the above listed problems via self-care/home management, therapeutic activities, therapeutic exercises  · Plan of Care Expires: 01/04/20  · Plan of Care Reviewed with: patient, family    Subjective     Pain/Comfort:  · Pain Rating 1: 0/10    Objective:     Communicated with: tomas prior to session.     General Precautions: Standard, fall   Orthopedic Precautions:N/A   Braces: N/A     Occupational Performance:     Bed Mobility:    · Pt [facility]     Functional Mobility/Transfers:  · Patient completed Sit <> Stand Transfer with contact guard assistance  with  rolling walker   · Functional Mobility: CGA with RW; cues for RW management     Activities of Daily Living:  · Grooming: contact guard assistance standing at sink       [facility]  6 Click ADL: 19    Treatment & Education:  Pt found [facility]  Reports no SOB throughout session, however, following standing at sink O2 dropping to 77% on 3L o2; pt required seated rest break as well as pursed lip breathing with O2 bumped to 4L to return to 91%  Stood to perform 1 x 10 unilateral UE therex on 4L o2 with sats 88%     Patient left up in chair with all lines intact, call button in reach, chair alarm on, nsg notified and family  presentEducation:      GOALS:   Multidisciplinary Problems     Occupational Therapy Goals        Problem: Occupational Therapy Goal    Goal Priority Disciplines Outcome Interventions   Occupational Therapy Goal     OT, PT/OT Ongoing, Progressing    Description:  Goals to be met by: 1/4/20     Patient will increase functional independence with ADLs by performing:    LE Dressing with Supervision.  Grooming while standing with Supervision.  Toileting from toilet with Supervision for hygiene and clothing management.   Supine to sit with Supervision.  Step transfer with Supervision  Toilet transfer to toilet with Supervision.  Increased functional strength to WFL for self care skills and functional mobility.  Upper extremity exercise program x10 reps per handout, with independence.                      Time Tracking:     OT Date of Treatment: 12/05/19  OT Start Time: 1448  OT Stop Time: 1516  OT Total Time (min): 28 min    Billable Minutes:Self Care/Home Management 10  Therapeutic Exercise 18    Haley Us OT  12/5/2019

## 2019-12-05 NOTE — PLAN OF CARE
Patient on oxygen with documented flow. Will attempt to wean per O2 order protocol.  Bipap PRN, not needed at this time. Will continue to monitor.

## 2019-12-06 VITALS
BODY MASS INDEX: 29.56 KG/M2 | HEART RATE: 74 BPM | OXYGEN SATURATION: 92 % | WEIGHT: 211.19 LBS | SYSTOLIC BLOOD PRESSURE: 132 MMHG | HEIGHT: 71 IN | RESPIRATION RATE: 18 BRPM | TEMPERATURE: 98 F | DIASTOLIC BLOOD PRESSURE: 70 MMHG

## 2019-12-06 LAB
ALBUMIN SERPL BCP-MCNC: 3.1 G/DL (ref 3.5–5.2)
ALP SERPL-CCNC: 81 U/L (ref 55–135)
ALT SERPL W/O P-5'-P-CCNC: 18 U/L (ref 10–44)
ANION GAP SERPL CALC-SCNC: 12 MMOL/L (ref 8–16)
APTT BLDCRRT: 29.5 SEC (ref 21–32)
AST SERPL-CCNC: 17 U/L (ref 10–40)
BASOPHILS # BLD AUTO: 0.01 K/UL (ref 0–0.2)
BASOPHILS NFR BLD: 0.2 % (ref 0–1.9)
BILIRUB SERPL-MCNC: 0.9 MG/DL (ref 0.1–1)
BUN SERPL-MCNC: 23 MG/DL (ref 8–23)
CALCIUM SERPL-MCNC: 9.4 MG/DL (ref 8.7–10.5)
CHLORIDE SERPL-SCNC: 96 MMOL/L (ref 95–110)
CO2 SERPL-SCNC: 32 MMOL/L (ref 23–29)
CREAT SERPL-MCNC: 1.3 MG/DL (ref 0.5–1.4)
DIFFERENTIAL METHOD: ABNORMAL
EOSINOPHIL # BLD AUTO: 0.2 K/UL (ref 0–0.5)
EOSINOPHIL NFR BLD: 2.9 % (ref 0–8)
ERYTHROCYTE [DISTWIDTH] IN BLOOD BY AUTOMATED COUNT: 13.5 % (ref 11.5–14.5)
EST. GFR  (AFRICAN AMERICAN): 58 ML/MIN/1.73 M^2
EST. GFR  (NON AFRICAN AMERICAN): 50 ML/MIN/1.73 M^2
GLUCOSE SERPL-MCNC: 191 MG/DL (ref 70–110)
HCT VFR BLD AUTO: 52.7 % (ref 40–54)
HGB BLD-MCNC: 17.2 G/DL (ref 14–18)
LYMPHOCYTES # BLD AUTO: 1.2 K/UL (ref 1–4.8)
LYMPHOCYTES NFR BLD: 17.8 % (ref 18–48)
MAGNESIUM SERPL-MCNC: 2 MG/DL (ref 1.6–2.6)
MCH RBC QN AUTO: 31 PG (ref 27–31)
MCHC RBC AUTO-ENTMCNC: 32.6 G/DL (ref 32–36)
MCV RBC AUTO: 95 FL (ref 82–98)
MONOCYTES # BLD AUTO: 0.6 K/UL (ref 0.3–1)
MONOCYTES NFR BLD: 9.7 % (ref 4–15)
NEUTROPHILS # BLD AUTO: 4.6 K/UL (ref 1.8–7.7)
NEUTROPHILS NFR BLD: 69.4 % (ref 38–73)
PLATELET # BLD AUTO: 217 K/UL (ref 150–350)
PMV BLD AUTO: 11.1 FL (ref 9.2–12.9)
POCT GLUCOSE: 188 MG/DL (ref 70–110)
POCT GLUCOSE: 256 MG/DL (ref 70–110)
POTASSIUM SERPL-SCNC: 3.4 MMOL/L (ref 3.5–5.1)
PROT SERPL-MCNC: 7.8 G/DL (ref 6–8.4)
RBC # BLD AUTO: 5.55 M/UL (ref 4.6–6.2)
SODIUM SERPL-SCNC: 140 MMOL/L (ref 136–145)
WBC # BLD AUTO: 6.62 K/UL (ref 3.9–12.7)

## 2019-12-06 PROCEDURE — 25000003 PHARM REV CODE 250: Mod: HCNC | Performed by: STUDENT IN AN ORGANIZED HEALTH CARE EDUCATION/TRAINING PROGRAM

## 2019-12-06 PROCEDURE — 94761 N-INVAS EAR/PLS OXIMETRY MLT: CPT | Mod: HCNC

## 2019-12-06 PROCEDURE — 80053 COMPREHEN METABOLIC PANEL: CPT | Mod: HCNC

## 2019-12-06 PROCEDURE — 83735 ASSAY OF MAGNESIUM: CPT | Mod: HCNC

## 2019-12-06 PROCEDURE — 97116 GAIT TRAINING THERAPY: CPT | Mod: HCNC

## 2019-12-06 PROCEDURE — 99900035 HC TECH TIME PER 15 MIN (STAT): Mod: HCNC

## 2019-12-06 PROCEDURE — 85730 THROMBOPLASTIN TIME PARTIAL: CPT | Mod: HCNC

## 2019-12-06 PROCEDURE — 97530 THERAPEUTIC ACTIVITIES: CPT | Mod: HCNC

## 2019-12-06 PROCEDURE — 94799 UNLISTED PULMONARY SVC/PX: CPT | Mod: HCNC

## 2019-12-06 PROCEDURE — 27000221 HC OXYGEN, UP TO 24 HOURS: Mod: HCNC

## 2019-12-06 PROCEDURE — 36415 COLL VENOUS BLD VENIPUNCTURE: CPT | Mod: HCNC

## 2019-12-06 PROCEDURE — 63600175 PHARM REV CODE 636 W HCPCS: Mod: HCNC | Performed by: STUDENT IN AN ORGANIZED HEALTH CARE EDUCATION/TRAINING PROGRAM

## 2019-12-06 PROCEDURE — 85025 COMPLETE CBC W/AUTO DIFF WBC: CPT | Mod: HCNC

## 2019-12-06 RX ORDER — AMLODIPINE BESYLATE 10 MG/1
10 TABLET ORAL DAILY
Qty: 30 TABLET | Refills: 11 | Status: SHIPPED | OUTPATIENT
Start: 2019-12-07 | End: 2020-04-02

## 2019-12-06 RX ORDER — CARVEDILOL 6.25 MG/1
6.25 TABLET ORAL 2 TIMES DAILY
Status: DISCONTINUED | OUTPATIENT
Start: 2019-12-06 | End: 2019-12-06 | Stop reason: HOSPADM

## 2019-12-06 RX ORDER — SPIRONOLACTONE 25 MG/1
12.5 TABLET ORAL DAILY
Qty: 15 TABLET | Refills: 11 | Status: SHIPPED | OUTPATIENT
Start: 2019-12-07 | End: 2020-04-02

## 2019-12-06 RX ORDER — ASPIRIN 81 MG/1
81 TABLET ORAL DAILY
Qty: 30 TABLET | Refills: 0 | Status: SHIPPED | OUTPATIENT
Start: 2019-12-07 | End: 2020-12-06

## 2019-12-06 RX ORDER — CARVEDILOL 6.25 MG/1
6.25 TABLET ORAL 2 TIMES DAILY
Qty: 60 TABLET | Refills: 11 | Status: SHIPPED | OUTPATIENT
Start: 2019-12-06 | End: 2020-12-05

## 2019-12-06 RX ORDER — FUROSEMIDE 40 MG/1
80 TABLET ORAL DAILY
Status: DISCONTINUED | OUTPATIENT
Start: 2019-12-06 | End: 2019-12-06 | Stop reason: HOSPADM

## 2019-12-06 RX ORDER — FUROSEMIDE 80 MG/1
80 TABLET ORAL DAILY
Qty: 30 TABLET | Refills: 11 | Status: SHIPPED | OUTPATIENT
Start: 2019-12-07 | End: 2019-12-26 | Stop reason: DRUGHIGH

## 2019-12-06 RX ORDER — LISINOPRIL 40 MG/1
40 TABLET ORAL DAILY
Qty: 90 TABLET | Refills: 3 | Status: SHIPPED | OUTPATIENT
Start: 2019-12-07 | End: 2020-12-06

## 2019-12-06 RX ADMIN — HEPARIN SODIUM 5000 UNITS: 5000 INJECTION, SOLUTION INTRAVENOUS; SUBCUTANEOUS at 02:12

## 2019-12-06 RX ADMIN — HEPARIN SODIUM 5000 UNITS: 5000 INJECTION, SOLUTION INTRAVENOUS; SUBCUTANEOUS at 05:12

## 2019-12-06 RX ADMIN — INSULIN ASPART 3 UNITS: 100 INJECTION, SOLUTION INTRAVENOUS; SUBCUTANEOUS at 12:12

## 2019-12-06 RX ADMIN — FUROSEMIDE 80 MG: 40 TABLET ORAL at 08:12

## 2019-12-06 RX ADMIN — SPIRONOLACTONE 12.5 MG: 25 TABLET, FILM COATED ORAL at 08:12

## 2019-12-06 RX ADMIN — ASPIRIN 81 MG: 81 TABLET, COATED ORAL at 08:12

## 2019-12-06 RX ADMIN — CARVEDILOL 6.25 MG: 6.25 TABLET, FILM COATED ORAL at 08:12

## 2019-12-06 RX ADMIN — LISINOPRIL 40 MG: 20 TABLET ORAL at 08:12

## 2019-12-06 RX ADMIN — AMLODIPINE BESYLATE 10 MG: 5 TABLET ORAL at 08:12

## 2019-12-06 NOTE — PHYSICIAN QUERY
PT Name: Raf Fine  MR #: [id number]     PHYSICIAN QUERY -  ACUITY OF CONDITION CLARIFICATION      CDS/: Violeta Martinez    RN CCDS           Contact information:[email or web address]  This form is a permanent document in the medical record.     Query Date: December 6, 2019    By submitting this query, we are merely seeking further clarification of documentation to reflect the severity of illness of your patient. Please utilize your independent clinical judgment when addressing the question(s) below.    The Medical record reflects the following:     Indicators   Supporting Clinical Findings Location in Medical Record   x Documentation of condition Acute Hypoxic Respiratory Failure 2/2 new onset HFrEF (45%) DS   x Lab Value(s) ZHM=[id number] Lab 12/2   x Radiology Findings Findings suggestive of pulmonary edema. CXR 12/2   x Treatment                                 Medication progressive SOB due to suspected viral illness over past week that has gotten worse, no h/o HF  -BNP 1244, Tn 0.168,  EKG with new RBBB no ischemic changes noted  -required BiPAP 50% 10/5 and lasix 40q8, transitioned to PO 40 q12  -TTE with mildly reduced EF  -weaned O2 to 3L NC, continued diuresis  - switched Toprol to coreg and transitioned to PO lasix   - discharged on home oxygen  DS    Other       Provider, please specify the acuity/chronicity of ___Systolic CHF_______________:    [ X ] Acute   [   ]  Clinically Undetermined       Please document in your progress notes daily for the duration of treatment until resolved, and include in your discharge summary.

## 2019-12-06 NOTE — PLAN OF CARE
POC reviewed with the pt and family, verbalized understanding.  VSS.  AAOx3.  No complaint of pain or any other distress noted throughout night.  PIV remained intact.  On 3L oxygen via NC, O2 sat >91% throughout night.  On continuous telemetry monitor, SR.  No ectopy noted.  Blood glucose monitored, insulin given as ordered.  Condom catheter remained intact.  Encouraged to turn frequently.  Safety maintained, free of falls throughout shift.  Instructed to call for any assistance.  Will continue to monitor.

## 2019-12-06 NOTE — PLAN OF CARE
Problem: Occupational Therapy Goal  Goal: Occupational Therapy Goal  Description  Goals to be met by: 1/4/20     Patient will increase functional independence with ADLs by performing:    LE Dressing with Supervision.  Grooming while standing with Supervision.  Toileting from toilet with Supervision for hygiene and clothing management.   Supine to sit with Supervision.  Step transfer with Supervision  Toilet transfer to toilet with Supervision.  Increased functional strength to WFL for self care skills and functional mobility.  Upper extremity exercise program x10 reps per handout, with independence.     Outcome: Adequate for Care Transition       Pt with d/c orders in chart. Pt's RW delivered to bedside and adjusted to appropriate height and educated on how to appropriately fit to height. Printed out energy conservation handout as well as pursed lip breathing. HH to assess pt. D/c OT

## 2019-12-06 NOTE — PROGRESS NOTES
[facility] Internal Medicine Progress Note    Date of Admit: 2019      Subjective:      Raf Fine is a 84 y.o.  male who  has a past medical history of Bundle branch block, trifascicular (12/3/2019), Diabetes mellitus, type 2, Hyperlipidemia, Hypertension, and Stroke.. The patient presented to [facility] on 2019 with a primary complaint of Shortness of Breath (c/o SOB on exertion that has been worsening for the past few weeks. Saw his pcp today and was told to come to the ER. )    Asleep but arousable. On 3L NC. Will perform walk test. He feels at baseline.      Objective:   Last 24 Hour Vital Signs:  BP  Min: 122/75  Max: 150/78  Temp  Av.3 °F (36.3 °C)  Min: 96.6 °F (35.9 °C)  Max: 97.9 °F (36.6 °C)  Pulse  Av.8  Min: 71  Max: 83  Resp  Av.3  Min: 16  Max: 18  SpO2  Av.8 %  Min: 88 %  Max: 95 %  Weight  Av.8 kg (211 lb 3.2 oz)  Min: 95.8 kg (211 lb 3.2 oz)  Max: 95.8 kg (211 lb 3.2 oz)  Body mass index is 29.46 kg/m².  I/O last 3 completed shifts:  In: 250 [P.O.:250]  Out: 4400 [Urine:4400]    Physical Examination:  Gen: awake alert, answering questions appropriately, on NC 3L, no distress  HEENT: NC AT PERRL EOMI, MMM  Neck: JVP ~7, no LAD  Cardiac: RRR  No m/r/g,   Lungs: minimal crackles in bases, no wheezes, or rhonchi,   Abd: soft NTND BS+   Ext: trace edema to shins bilaterally, no cyanosis or clubbing  Skin: warm dry no rash    Laboratory:  Most Recent Data:    Trended Lab Data:  Recent Labs   Lab 19  0555 19  0632 19  0709 19  0710   WBC 7.55 6.89  --  6.62   HGB 17.2 17.0  --  17.2   HCT 52.8 52.6  --  52.7    225  --  217   MCV 96 96  --  95   RDW 13.9 13.6  --  13.5    139 140  --    K 4.0 3.4* 3.4*  --    CL 98 95 96  --    CO2 34* 32* 32*  --    BUN 19 22 23  --    CREATININE 1.5* 1.4 1.3  --    * 201* 191*  --    PROT 8.4 8.1 7.8  --    ALBUMIN 3.4* 3.2* 3.1*  --    BILITOT 1.3* 1.0 0.9  --     AST 15 17 17  --    ALKPHOS 96 84 81  --    ALT 20 16 18  --        Trended Cardiac Data:  Recent Labs   Lab 12/02/19  1311 12/02/19  2134 12/03/19  0124 12/03/19  0337   TROPONINI 0.168* 0.262* 0.262* 0.262*   BNP 1,244*  --   --   --        Microbiology Data:  Flu negative  Procal negative    Other Results:  EKG (my interpretation): 1st degree AV block, LAE, RBBB  Radiology:  Imaging Results          X-Ray Chest AP Portable (Final result)  Result time 12/02/19 14:52:34    Final result by Deric Garcia MD (12/02/19 14:52:34)                 Impression:      Findings suggestive of pulmonary edema.      Electronically signed by: Deric Garcia MD  Date:    12/02/2019  Time:    14:52             Narrative:    EXAMINATION:  XR CHEST AP PORTABLE    CLINICAL HISTORY:  shortness of breath;    TECHNIQUE:  Single frontal view of the chest was performed.    COMPARISON:  02/25/2008    FINDINGS:  Moderate cardiomegaly similar to prior exam.  Atherosclerosis of the aortic arch.  Prominence of pulmonary vasculature.  Prominent interstitial lung markings with a lower lobe and bilateral predominance which may be seen with pulmonary edema or infectious process.  No pleural effusion.  No pneumothorax.  Degenerative changes of the spine.                                     Assessment:     Raf Fine is a 84 y.o. male with:  Patient Active Problem List    Diagnosis Date Noted    Bundle branch block, trifascicular 12/03/2019    Class 1 obesity due to excess calories with serious comorbidity and body mass index (BMI) of 30.0 to 30.9 in adult 12/03/2019    Shortness of breath     SOB (shortness of breath) 12/02/2019    Pulmonary edema 12/02/2019    Respiratory failure with hypoxia 12/02/2019    Congestive heart failure 12/02/2019    Diabetes mellitus, type II 05/27/2013    HBP (high blood pressure) 05/27/2013    Type II or unspecified type diabetes mellitus without mention of complication, not stated as  uncontrolled 04/18/2013        Plan:     Acute Hypoxic Respiratory Failure 2/2 new onset HFrEF (45%)  -likely 2/2 HTN emergency vs new onset HF  -progressive SOB due to suspected viral illness over past week that has gotten worse, no h/o HF  -BNP 1244, Tn 0.168,  EKG with new RBBB no ischemic changes noted  -required BiPAP 50% 10/5 and lasix 40q8, transitioned to PO 40 q12  -TTE with mildly reduced EF  -weaning O2, currently on 3L NC, continue diuresis  -may need home O2, 50 pack year smoking history, will assess home O2 today  - switched Toprol to coreg and switched to PO lasix today    URI  -recent productive cough and now with SOB, no recent sick contacts  -CXR with interstitial infiltrates (edema vs airspace disease)  -afebrile, nml WBC, tachycardic and tachypneic, likely viral  -procal negative, no signs of infections, discontinued levaquin    NSTEMI  -endorses SOB, no CP  -EKG with no ischemic changes (RBBB new), Tn 0.168, BNP 1244  -likely demand in setting of profound HTN, Tn trended up to 0.262 x2  -cards c/s for new onset HF, started toprol XL 50, increased lisinopril, and getting diuresed (transitioned to 40 PO BID)    DM2 with renal manifestations  -a1c of 7.1  -currently on januvia  -will cover with low dose SSI to see requirements and initiate long acting after 24 hours, qac/hs accuchecks    HTN, essential  -on norvasc 5 and lisinopril 20 that's typically controlled  -very elevated in ED with pulmonary edema and elevated BNP, despite compliance  -resumed home meds and started on nitro ggt to get MAPs from 135 to 110-120 overnight  -increased norvasc and lisinopril, addition of metoprolol succ    HLD  -resume simvastatin 20  -lipid panel with chol 166, HDL 42,     Diet: Cardiac/Diabetic  Fluids: none  Ppx: heparin  Code: Full    Dispo: weaning O2, currently at 3L. DC on home O2 today    Code Status:         Dyllan Peralta MD  [facility] Internal Medicine HOIII    [facility] Medicine Hospitalist Pager  numbers:   [facility] Hospitalist Medicine Team A (Radha/Cesilia): [phone]  [facility] Hospitalist Medicine Team B (Sherry/Zoya):  [phone]

## 2019-12-06 NOTE — DISCHARGE SUMMARY
[facility] Hospital Medicine Discharge Summary    Primary Team: [facility] Hospitalist Team A  Attending Physician: Anuel Garcia MD  Resident: Dr. Hoang  Intern: Mohit    Date of Admit: 12/2/2019  Date of Discharge: 12/6/2019    Discharge to: home  Condition: stable    Discharge Diagnoses     Patient Active Problem List   Diagnosis    Type II or unspecified type diabetes mellitus without mention of complication, not stated as uncontrolled    Diabetes mellitus, type II    HBP (high blood pressure)    SOB (shortness of breath)    Pulmonary edema    Respiratory failure with hypoxia    Congestive heart failure    Bundle branch block, trifascicular    Class 1 obesity due to excess calories with serious comorbidity and body mass index (BMI) of 30.0 to 30.9 in adult    Shortness of breath       Consultants and Procedures     Consultants:  Cardiology    Procedures:   none    Imaging:  CXR - pulmonary edema    Brief History of Present Illness      Mr. Fine is an 83 y/o M who was admitted for acute hypoxic respiratory failure secondary to new onset heart failure with reduced ejection fraction and hypertension. BNP 1244 on arrival, CXR with pulmonary edema, trop 0.168 and new RBBB noted on EKG. Required BiPAP and diuresis with Lasix. Oxygen was weaned over the course of his stay. Cardiology was consulted, lisinopril increased and toprol added. TTE with mildly reduced EF of 45%. He was discharge on Lasix, norvasc 10, coreg 6.25 BID, spironolactone 12.5mg total QD, and ASA 81. As well as home oxygen, with walker and shower chair for home use. To follow up with PCP and Cards.     For the full HPI please refer to the History & Physical from this admission.    Hospital Course By Problem with Pertinent Findings     Acute Hypoxic Respiratory Failure 2/2 new onset HFrEF (45%)  -likely 2/2 HTN emergency vs new onset HF  -progressive SOB due to suspected viral illness over past week that has gotten worse, no h/o HF  -BNP 1244, Tn  0.168,  EKG with new RBBB no ischemic changes noted  -required BiPAP 50% 10/5 and lasix 40q8, transitioned to PO 40 q12  -TTE with mildly reduced EF  -weaned O2 to 3L NC, continued diuresis  - switched Toprol to coreg and transitioned to PO lasix   - discharged on home oxygen      URI  -recent productive cough and SOB, no recent sick contacts  -CXR with interstitial infiltrates (edema vs airspace disease)  -afebrile, nml WBC, tachycardic and tachypneic, likely viral  -procal negative, no signs of infections, discontinued levaquin     NSTEMI  -endorsed SOB, no CP  -EKG with no ischemic changes (RBBB new), Tn 0.168, BNP 1244  -likely demand in setting of profound HTN, Tn trended up to 0.262 x2  -cards c/s for new onset HF, started toprol XL 50, increased lisinopril, and diuresed (transitioned to 40 PO BID)  - discharged on po Lasix 80 mg once daily     DM2 with renal manifestations  -a1c of 7.1  -SSI while in hospital  -resume home glipizide and januvia on discharge     HTN, essential  -on norvasc 5 and lisinopril 20 that's typically controlled  -very elevated in ED with pulmonary edema and elevated BNP, despite compliance  -resumed home meds and started on nitro ggt to get MAPs from 135 to 110-120 overnight  -increased norvasc and lisinopril, addition of metoprolol succ  - discharged on norvasc 10, coreg 6.25 BID, spironolactone 12.5mg total QD, and ASA 81      HLD  -resume simvastatin 20  -lipid panel with chol 166, HDL 42,     Discharge Medications      Raf Fine   Home Medication Instructions TALIB:[id number]    Printed on:12/06/19 6870   Medication Information                      ACCU-CHEK EDITH PLUS TEST STRP Strp               ACCU-CHEK SOFTCLIX LANCETS Misc               amLODIPine (NORVASC) 10 MG tablet  Take 1 tablet (10 mg total) by mouth once daily.             aspirin (ECOTRIN) 81 MG EC tablet  Take 1 tablet (81 mg total) by mouth once daily.             B2/vits A,C,E/lut/zeaxanth/min  (ICAPS ORAL)  Take by mouth.             carvedilol (COREG) 6.25 MG tablet  Take 1 tablet (6.25 mg total) by mouth 2 (two) times daily.             furosemide (LASIX) 80 MG tablet  Take 1 tablet (80 mg total) by mouth once daily.             glipizide-metformin (METAGLIP) 2.5-500 mg per tablet  TAKE 1 TABLET TWICE DAILY             lisinopril (PRINIVIL,ZESTRIL) 40 MG tablet  Take 1 tablet (40 mg total) by mouth once daily.             simvastatin (ZOCOR) 20 MG tablet  Take 1 tablet (20 mg total) by mouth every evening.             SITagliptin (JANUVIA) 100 MG Tab  Take 100 mg by mouth once daily.             spironolactone (ALDACTONE) 25 MG tablet  Take 1/2 tablets (12.5 mg total) by mouth once daily.                 Discharge Information:   Diet:  Cardiac/diabetic    Physical Activity:  As tolerated with walker             Instructions:  1. Take all medications as prescribed  2. Keep all follow-up appointments  3. Return to the hospital or call your primary care physicians if any worsening symptoms such as fever, chest pain, shortness of breath, return of symptoms, or any other concerns.    Follow-Up Appointments:  Follow up with PCP and Cardiology.     Lala Rueda MD  \[facility]\"" Internal Medicine, [facility]

## 2019-12-06 NOTE — PROGRESS NOTES
2:40 pm, [place] submitted to [facility] and [employer] , awaiting reply.    2:22 pm, [facility], pt and wife gave choices for HH, either [facility] or [facility]    TN provided pt & wife with HH agency list, they are looking at now    DME: RW & Home O2 orders sent via [employer] to [facility], Melissa Marquis. She is running the pt's benefits through his insurance and will let TN know when DME can be pulled

## 2019-12-06 NOTE — PLAN OF CARE
Patient awake, alert and oriented.VVS.On tele, no ectopy on tele. No complaints of pain. Hepan for VTE prevention.Blood glucose monitored AC/HS and covered per sliding scale. . IV dressing CDI and flushes . Bed in low position and locked. Call light and personal items with in reach. Bed alarm remains on. Instructed to call for assistance, verbalized understanding. Educated on new medications.

## 2019-12-06 NOTE — PLAN OF CARE
Home Oxygen Evaluation    Date Performed: 2019    1) Patient's Home O2 Sat on room air, while at rest: 87      If O2 sats on room air at rest are 88% or below, patient qualifies. No additional testing needed. Document N/A in steps 2 and 3. If 89% or above, complete steps 2.      2) Patient's O2 Sat on room air while exercisin      If O2 sats on room air while exercising remain 89% or above patient does not qualify, no further testing needed Document N/A in step 3. If O2 sats on room air while exercising are 88% or below, continue to step 3.      3) Patient's O2 Sat while exercising on O2: 90 at 4 LPM         (Must show improvement from #2 for patients to qualify)    If O2 sats improve on oxygen, patient qualifies for portable oxygen. If not, the patient does not qualify.

## 2019-12-06 NOTE — PLAN OF CARE
O2 saturation 90% on nasal cannula 3 lpm. Patient achieving 750-1000 ml on incentive spirometer. Will continue to monitor.

## 2019-12-06 NOTE — NURSING
[facility] RN clinician visited patient at hospital bedside.  Patient, spouse, and daughter were present upon my visit.  Educated patient on purpose of [facility].  Discussed with patient that he would be followed by [facility] physician for approximately 30 days post hospital discharge, rather than [facility] Care physician.  Patient states he has a current PCP, but no cardiologist.  Also, discussed that he should call clinic for any medical needs or urgent visits while under the care of [facility] physician.  Patient given and agreed on scheduled appointment time and date.  Patient given [facility] introduction sheet containing clinic phone number.  Patient is admitted with a diagnosis of respiratory failure with new CHF diagnosis.  Signs and symptoms of heart failure to promptly report to provider discussed in detail.  Discussed importance of taking daily weights and what to do if there is a 2-3 pound weight gain in a day or 5 pound or more weight gain in one week.  Patient's wife states she has a scale at home, but may purchase a new one that talks as her  cannot see well.  Patient educated on low sodium diet and fluid limitation.  Reviewed with patient some high sodium foods and drinks he should avoid.  Spouse states she cooks most of their meals and has always cooked low salt.  Instructions given on performing activities as tolerated, otherwise, instructed by physician.  Urged patient on importance of medication compliance related to heart failure diagnosis.  8 Step Plan for Heart Failure Patients booklet given to patient.  Patient and family given opportunity to ask questions, stated understanding of education provided.  Teach back method used.

## 2019-12-06 NOTE — PLAN OF CARE
Problem: Physical Therapy Goal  Goal: Physical Therapy Goal  Description  Goals to be met by: 2020     Patient will increase functional independence with mobility by performin. Supine to sit with Modified [place]  2. Sit to stand transfer with Modified [place]  3. Bed to chair transfer with Modified [place] using Rolling Walker  4. Gait  x 200 feet with Modified [place] using Rolling Walker.   5. Lower extremity exercise program x15 reps per handout, with supervision     Outcome: Ongoing, Progressing   Home with HH recommended

## 2019-12-06 NOTE — PT/OT/SLP PROGRESS
Physical Therapy Treatment    Patient Name:  Raf Fine   MRN:  [id number]    Recommendations:     Discharge Recommendations:  home with home health   Discharge Equipment Recommendations: walker, rolling, shower chair   Barriers to discharge: None    Assessment:     Raf Fine is a 84 y.o. male admitted with a medical diagnosis of Respiratory failure with hypoxia.  He presents with the following impairments/functional limitations:  gait instability, weakness, decreased lower extremity function, impaired balance, impaired endurance, impaired functional mobilty, impaired self care skills, impaired cardiopulmonary response to activity . Patient with slowly improving tolerance for activity. Gait with RW and O2 ~ 180 ft SBA .    Rehab Prognosis: Good; patient would benefit from acute skilled PT services to address these deficits and reach maximum level of function.    Recent Surgery: * No surgery found *      Plan:     During this hospitalization, patient to be seen 5 x/week to address the identified rehab impairments via gait training, therapeutic activities, therapeutic exercises and progress toward the following goals:    · Plan of Care Expires:  01/04/20    Subjective     Chief Complaint: weakness generalized  Patient/Family Comments/goals: go home  Pain/Comfort:  · Pain Rating 1: 0/10  · Pain Rating Post-Intervention 1: 0/10      Objective:     Communicated with primary nurse prior to session.  Patient found HOB elevated with Condom Catheter, telemetry upon PT entry to room.     General Precautions: Standard, fall   Orthopedic Precautions:N/A   Braces: N/A     Functional Mobility:  · Bed Mobility:     · Supine to Sit: supervision and stand by assistance  · Sit to Supine: supervision and stand by assistance  · Transfers:     · Sit to Stand:  contact guard assistance with rolling walker  · Gait: 180 ft with O2/RW and SBA  · Balance: fair + with RW      AM-PAC 6 CLICK MOBILITY  Turning over in bed  (including adjusting bedclothes, sheets and blankets)?: 4  Sitting down on and standing up from a chair with arms (e.g., wheelchair, bedside commode, etc.): 3  Moving from lying on back to sitting on the side of the bed?: 4  Moving to and from a bed to a chair (including a wheelchair)?: 3  Need to walk in hospital room?: 3  Climbing 3-5 steps with a railing?: 3  Basic Mobility Total Score: 20       Therapeutic Activities and Exercises:   na    Patient left HOB elevated with all lines intact, call button in reach and family present..    GOALS:   Multidisciplinary Problems     Physical Therapy Goals        Problem: Physical Therapy Goal    Goal Priority Disciplines Outcome Goal Variances Interventions   Physical Therapy Goal     PT, PT/OT Ongoing, Progressing     Description:  Goals to be met by: 2020     Patient will increase functional independence with mobility by performin. Supine to sit with Modified [place]  2. Sit to stand transfer with Modified [place]  3. Bed to chair transfer with Modified [place] using Rolling Walker  4. Gait  x 200 feet with Modified [place] using Rolling Walker.   5. Lower extremity exercise program x15 reps per handout, with supervision                      Time Tracking:     PT Received On: 19  PT Start Time: 1415     PT Stop Time: 1430  PT Total Time (min): 15 min     Billable Minutes: Gait Training 15    Treatment Type: Treatment  PT/PTA: PT           Peewee Chen, PT  2019

## 2019-12-06 NOTE — PLAN OF CARE
Patient awake, alert and oriented.VVS.On tele, no ectopy on tele. No complaints of pain. Heparin for VTE prevention.Blood glucose monitored AC/HS and covered per sliding scale. IV dressing CDI and flushes . Bed in low position and locked. Call light and personal items with in reach. Bed alarm remains on. Instructed to call for assistance, verbalized understanding. Educated on new medications. Family at the bedside.

## 2019-12-06 NOTE — PT/OT/SLP PROGRESS
Occupational Therapy   Treatment/Dc Summary    Name: Raf Fine  MRN: [id number]  Admitting Diagnosis:  Respiratory failure with hypoxia       Recommendations:     Discharge Recommendations: home health PT, home health OT  Discharge Equipment Recommendations:  walker, rolling, shower chair, oxygen  Barriers to discharge:  None    Assessment:     Raf Fine is a 84 y.o. male with a medical diagnosis of Respiratory failure with hypoxia.  He presents with impaired respiratory status . Performance deficits affecting function are weakness, impaired self care skills, impaired balance, impaired endurance, impaired functional mobilty, gait instability, impaired cardiopulmonary response to activity.     Pt with d/c orders in chart. Pt's RW delivered to bedside and adjusted to appropriate height and educated on how to appropriately fit to height. Printed out energy conservation handout as well as pursed lip breathing.  to assess pt. D/c OT    Rehab Prognosis:  Good; patient would benefit from acute skilled OT services to address these deficits and reach maximum level of function.       Plan:     Patient to be seen (d/c OT as pt to d/c from hospital ) to address the above listed problems via self-care/home management, therapeutic activities, therapeutic exercises  · Plan of Care Expires: 12/06/19  · Plan of Care Reviewed with: patient, spouse, daughter    Subjective     Pain/Comfort:  · Pain Rating 1: 0/10    Objective:     Communicated with: nsjeaneth prior to session.      General Precautions: Standard, fall   Orthopedic Precautions:N/A   Braces: N/A     Occupational Performance:     [facility] 6 Click ADL: 20    Treatment & Education:  Pt educated on home safety with use of O2  Adjusted pt's RW to height; educated on how to adjust RW to needed height if adjustment required   Printed out and discussed energy conservation techniques with pt as well as pursed lip breathing     Patient left supine with all lines intact, call  button in reach, nsg notified, bed alarm on and family presentEducation:      GOALS:   Multidisciplinary Problems     Occupational Therapy Goals        Problem: Occupational Therapy Goal    Goal Priority Disciplines Outcome Interventions   Occupational Therapy Goal     OT, PT/OT Adequate for Care Transition    Description:  Goals to be met by: 1/4/20     Patient will increase functional independence with ADLs by performing:    LE Dressing with Supervision.  Grooming while standing with Supervision.  Toileting from toilet with Supervision for hygiene and clothing management.   Supine to sit with Supervision.  Step transfer with Supervision  Toilet transfer to toilet with Supervision.  Increased functional strength to WFL for self care skills and functional mobility.  Upper extremity exercise program x10 reps per handout, with independence.                      Time Tracking:     OT Date of Treatment: 12/06/19  OT Start Time: 1445  OT Stop Time: 1500  OT Total Time (min): 15 min    Billable Minutes:Therapeutic Activity 15    Haley Us OT  12/6/2019

## 2019-12-09 ENCOUNTER — PATIENT OUTREACH (OUTPATIENT)
Dept: ADMINISTRATIVE | Facility: CLINIC | Age: 84
End: 2019-12-09

## 2019-12-09 RX ORDER — SITAGLIPTIN 100 MG/1
TABLET, FILM COATED ORAL
Qty: 90 TABLET | Refills: 3 | Status: SHIPPED | OUTPATIENT
Start: 2019-12-09

## 2019-12-09 RX ORDER — SIMVASTATIN 20 MG/1
TABLET, FILM COATED ORAL
Qty: 90 TABLET | Refills: 3 | Status: SHIPPED | OUTPATIENT
Start: 2019-12-09 | End: 2020-02-27 | Stop reason: SDUPTHER

## 2019-12-09 NOTE — PATIENT INSTRUCTIONS
Discharge Instructions for Heart Failure  The heart is a muscle that pumps oxygen-rich blood to all parts of the body. When you have heart failure, the heart is not able to pump as well as it should. Blood and fluid may back up into the lungs (congestive heart failure), and some parts of the body dont get enough oxygen-rich blood to work normally. These problems lead to the symptoms of heart failure. Heart failure can occur due to an injury to the heart or from natural processes.  You can control symptoms of heart failure with some lifestyle changes and by following your doctor's advice.  Home care  Activity  Ask your healthcare provider about an exercise program. You can benefit from simple activities such as walking or gardening. Exercising most days of the week can make you feel better. Don't be discouraged if your progress is slow at first. Rest as needed. Stop activity if you develop symptoms such as chest pain, lightheadedness, or significant shortness of breath. Find activities that you enjoy, such as brisk walking, dancing, swimming, or gardening. These will help you stay active and strengthen your heart.  Diet  Follow a heart healthy diet. And make sure to limit the salt (sodium) in your diet. Salt causes your body to hold water. This makes your heart work harder as there is more fluid for the heart to pump. Limit your salt by doing the following:  · Limit canned, dried, packaged, and fast foods.  · Don't add salt to your food.  · Season foods with herbs instead of salt.  · Watch how much liquids you drink. Drinking too much can make heart failure worse. Talk with your health care provider about how much you should drink each day.  · Limit the amount of alcohol you drink. It may harm your heart. Women should have no more than 1 drink a day and men should have no more than 2.  · When you eat out, request that your meals have no added salt.  Tobacco  If you smoke, it's very important to quit. Smoking  increases your chances of having a heart attack by harming the blood vessels that provide oxygen to your heart. This makes heart failure worse. Quitting smoking is the number one thing you can do to improve your health. Enroll in a stop-smoking program to improve your chances of success. Talk with your healthcare provider about medicines or nicotine replacement therapy to help you quit smoking. Ask your healthcare provider about smoking cessation support groups.  Medicine  Take your medicines exactly as prescribed. Learn the names and purpose of each of your medicines. Keep an accurate medicine list and current dosages with you at all times. Don't skip doses. If you miss a dose of your medicine, take it as soon as you remember. If you miss a dose and it's almost time for your next dose, just wait and take your next dose at the normal time. Don't take a double dose. If you are unsure, call your doctor's office. Make sure not to mix up your medicines or forget what you've taken the same day.  Weight monitoring  Weigh yourself every day. A sudden weight gain can mean your heart failure is getting worse. Weigh yourself at the same time of day and in the same kind of clothes. Ideally, weigh yourself first thing in the morning after you empty your bladder, but before you eat breakfast. Your healthcare provider will show you how to track your weight. He or she will also discuss with you when you should call if you have a sudden, unexpected increase in your weight.  In general, your healthcare provider may ask you to report if your weight goes up by more than 2 pounds in 1 day,  5 pounds in 1 week, or whatever weight gain you were told by your doctor. This is a sign that you are retaining more fluid than you should be. Clues to weight gain include checking your ankles for swelling, or noticing you are short of breath when you lie down.  Follow-up care  Make a follow-up appointment as directed. Depending on the type and  severity of heart failure you have, you may need follow-up as early as 7 days from hospital discharge. Keep appointments for checkups and lab tests that are needed to check your medicines and condition.  Recognize that your health and even survival depend on your following medical recommendations.  Symptoms  Heart failure can cause a variety of symptoms, including:  · Shortness of breath  · Trouble breathing at night, especially when you lie down  · Swelling in the legs and feet or in the belly (abdomen)  · Becoming easily fatigued  · Irregular or rapid heartbeat  · Weakness or lightheadedness  · Swelling of the neck veins  It is important to know what to do if symptoms get worse or if you develop signs of worsening heart failure.     When to see your healthcare provider  Call your doctor right away if you have any of these signs of worsening heart failure:  · Sudden weight gain (more than 2 pounds in 1 day or 5 pounds in 1 week, or whatever weight gain you were told to report by your doctor)  · Trouble breathing not related to being active  · New or increased swelling of your legs or ankles  · Swelling or pain in your abdomen  · Breathing trouble at night (waking up short of breath, needing more pillows to breathe)  · Frequent coughing that doesn't go away  · Feeling much more tired than usual  Call 911  Call 911 right away if you have:  · Severe shortness of breath, such that you can't catch your breath even while resting  · Severe chest pain that does not resolve with rest or nitroglycerin  · Pink, foamy mucus with cough and shortness of breath  · A continuous rapid or irregular heartbeat  · Passing out or fainting  · Stroke symptoms such as sudden numbness or weakness on one side of your face, arm, or leg or sudden confusion, trouble speaking or vision changes   Date Last Reviewed: 3/21/2016  © 9395-3950 [employer]. [address] [place] [address], [place] [address]. All rights reserved. This information  is not intended as a substitute for professional medical care. Always follow your healthcare professional's instructions.        Taking a Diuretic  Your healthcare provider has prescribed a diuretic, or water pill, to help your body get rid of excess water and salt and maintain appropriate fluid balance. Taking your diuretic can help you feel better, breathe better, move more easily, and have more energy.     Take your medication early in the day at the same time each day.      The name of my diuretic is:     ________________________________________   Medicine tips  · Read the fact sheet that comes with your medicine. It tells you when and how to take it. Ask for a medicine sheet if you dont get one.  · If you take 2 or more doses each day, take the last one before dinner if you can. That way youll get up fewer times during the night to go to the bathroom. However, make sure you have enough time between doses during the day.   · If you miss a dose, take it as soon as you remember. If it is almost time for the next dose, skip the missed dose. Do not take a double dose.  · If you miss 2 or more consecutive doses, call your healthcare provider. You may be at risk for fluid buildup.  For your safety  · Follow your doctors guidelines for potassium intake. You may need to take a potassium supplement. Or, you may need to avoid potassium supplements, salt substitutes with potassium, or large amounts of high-potassium foods (such as bananas, potatoes, broccoli, and milk). Recommendations for potassium will depend on the type of diuretic you are prescribed along with your kidney function and other factors. Your healthcare provider will likely want to check your potassium level regularly while you are taking this medicine.  · Talk to your healthcare provider about whether it is safe for you to drink alcohol while taking this medicine.  · Get up slowly when you are sitting or lying down. This helps prevent dizziness  and falls due to dizziness.  · Ask your healthcare provider or pharmacist before you take any other prescription or nonprescription medicine or herbal supplements. Some of them may interact with your diuretic and keep it from working correctly.  · Limit exposure to sunlight. A diuretic may increase your sensitivity to the sun. Even brief sun exposure may cause skin rash, itching, redness, or other discoloration. It may also lead to severe sunburn. To protect your skin do the following:  ¨ Avoid direct sunlight, especially between 10 a.m. and 2 p.m., whenever possible.  ¨ Apply a daily sunblock of at least SPF 15 (or higher) to any exposed skin, including your lips.  ¨ Wear protective clothing, such as a hat and sunglasses, when you are outdoors.  ¨ Avoid sunlamps and tanning booths.  ¨ Long-sleeve shirts and pants in the summer can help protect your skin.        When to seek medical advice  Call your healthcare provider right away if any of these occur:  · Have diarrhea, constipation, nausea or vomiting.  · Lose your appetite or notice a rapid or excessive weight gain.  · Feel extremely tired or weak.  · Have shortness of breath or difficulty breathing or swallowing.  · Have numbness or tingling in your hands, feet, or lips or a ringing in your ears.  · Feel lightheaded when getting up after sitting or lying down.  · Have headaches, blurred vision, or feel a sense of confusion.  · Have muscle cramps or joint pain.  · Have chest pains or changes in your heartbeat.  · Have an excessive thirst or a dry mouth.  · Notice a skin rash.  · Gain more than 2 pounds in 1 day or 4 pounds in 1 week (ask your healthcare provider for his or her specific direction).  · Have any other unusual symptoms.   Date Last Reviewed: 6/1/2016  © 2344-1858 [employer]. [address] [place] [address], [place] [address]. All rights reserved. This information is not intended as a substitute for professional medical care. Always follow your  healthcare professional's instructions.

## 2019-12-12 ENCOUNTER — OFFICE VISIT (OUTPATIENT)
Dept: PRIMARY CARE CLINIC | Facility: CLINIC | Age: 84
End: 2019-12-12
Payer: MEDICARE

## 2019-12-12 ENCOUNTER — LAB VISIT (OUTPATIENT)
Dept: LAB | Facility: HOSPITAL | Age: 84
End: 2019-12-12
Attending: INTERNAL MEDICINE
Payer: MEDICARE

## 2019-12-12 VITALS
WEIGHT: 199.5 LBS | DIASTOLIC BLOOD PRESSURE: 71 MMHG | TEMPERATURE: 97 F | BODY MASS INDEX: 27.83 KG/M2 | SYSTOLIC BLOOD PRESSURE: 121 MMHG | OXYGEN SATURATION: 96 % | HEART RATE: 76 BPM

## 2019-12-12 DIAGNOSIS — E11.9 TYPE 2 DIABETES MELLITUS WITHOUT COMPLICATION, WITHOUT LONG-TERM CURRENT USE OF INSULIN: ICD-10-CM

## 2019-12-12 DIAGNOSIS — I10 ESSENTIAL HYPERTENSION: ICD-10-CM

## 2019-12-12 DIAGNOSIS — I50.21 ACUTE SYSTOLIC HEART FAILURE: Primary | ICD-10-CM

## 2019-12-12 DIAGNOSIS — I50.21 ACUTE SYSTOLIC HEART FAILURE: ICD-10-CM

## 2019-12-12 DIAGNOSIS — R09.02 HYPOXIA: ICD-10-CM

## 2019-12-12 PROBLEM — J81.1 PULMONARY EDEMA: Status: RESOLVED | Noted: 2019-12-02 | Resolved: 2019-12-12

## 2019-12-12 PROBLEM — J96.91 RESPIRATORY FAILURE WITH HYPOXIA: Status: RESOLVED | Noted: 2019-12-02 | Resolved: 2019-12-12

## 2019-12-12 PROBLEM — R06.02 SOB (SHORTNESS OF BREATH): Status: RESOLVED | Noted: 2019-12-02 | Resolved: 2019-12-12

## 2019-12-12 LAB — TSH SERPL DL<=0.005 MIU/L-ACNC: 0.79 UIU/ML (ref 0.4–4)

## 2019-12-12 PROCEDURE — 1126F AMNT PAIN NOTED NONE PRSNT: CPT | Mod: HCNC,S$GLB,, | Performed by: INTERNAL MEDICINE

## 2019-12-12 PROCEDURE — 1126F PR PAIN SEVERITY QUANTIFIED, NO PAIN PRESENT: ICD-10-PCS | Mod: HCNC,S$GLB,, | Performed by: INTERNAL MEDICINE

## 2019-12-12 PROCEDURE — 99499 UNLISTED E&M SERVICE: CPT | Mod: HCNC,S$GLB,, | Performed by: INTERNAL MEDICINE

## 2019-12-12 PROCEDURE — 36415 COLL VENOUS BLD VENIPUNCTURE: CPT | Mod: HCNC

## 2019-12-12 PROCEDURE — 1101F PT FALLS ASSESS-DOCD LE1/YR: CPT | Mod: HCNC,CPTII,S$GLB, | Performed by: INTERNAL MEDICINE

## 2019-12-12 PROCEDURE — 99205 PR OFFICE/OUTPT VISIT, NEW, LEVL V, 60-74 MIN: ICD-10-PCS | Mod: HCNC,S$GLB,, | Performed by: INTERNAL MEDICINE

## 2019-12-12 PROCEDURE — 99999 PR PBB SHADOW E&M-EST. PATIENT-LVL III: CPT | Mod: PBBFAC,HCNC,, | Performed by: INTERNAL MEDICINE

## 2019-12-12 PROCEDURE — 99499 RISK ADDL DX/OHS AUDIT: ICD-10-PCS | Mod: HCNC,S$GLB,, | Performed by: INTERNAL MEDICINE

## 2019-12-12 PROCEDURE — 3078F PR MOST RECENT DIASTOLIC BLOOD PRESSURE < 80 MM HG: ICD-10-PCS | Mod: HCNC,CPTII,S$GLB, | Performed by: INTERNAL MEDICINE

## 2019-12-12 PROCEDURE — 99999 PR PBB SHADOW E&M-EST. PATIENT-LVL III: ICD-10-PCS | Mod: PBBFAC,HCNC,, | Performed by: INTERNAL MEDICINE

## 2019-12-12 PROCEDURE — 84443 ASSAY THYROID STIM HORMONE: CPT | Mod: HCNC

## 2019-12-12 PROCEDURE — 3078F DIAST BP <80 MM HG: CPT | Mod: HCNC,CPTII,S$GLB, | Performed by: INTERNAL MEDICINE

## 2019-12-12 PROCEDURE — 1159F PR MEDICATION LIST DOCUMENTED IN MEDICAL RECORD: ICD-10-PCS | Mod: HCNC,S$GLB,, | Performed by: INTERNAL MEDICINE

## 2019-12-12 PROCEDURE — 1159F MED LIST DOCD IN RCRD: CPT | Mod: HCNC,S$GLB,, | Performed by: INTERNAL MEDICINE

## 2019-12-12 PROCEDURE — 99205 OFFICE O/P NEW HI 60 MIN: CPT | Mod: HCNC,S$GLB,, | Performed by: INTERNAL MEDICINE

## 2019-12-12 PROCEDURE — 1101F PR PT FALLS ASSESS DOC 0-1 FALLS W/OUT INJ PAST YR: ICD-10-PCS | Mod: HCNC,CPTII,S$GLB, | Performed by: INTERNAL MEDICINE

## 2019-12-12 PROCEDURE — 3074F PR MOST RECENT SYSTOLIC BLOOD PRESSURE < 130 MM HG: ICD-10-PCS | Mod: HCNC,CPTII,S$GLB, | Performed by: INTERNAL MEDICINE

## 2019-12-12 PROCEDURE — 3074F SYST BP LT 130 MM HG: CPT | Mod: HCNC,CPTII,S$GLB, | Performed by: INTERNAL MEDICINE

## 2019-12-12 NOTE — PROGRESS NOTES
[facility]   New Visit Progress Note   Recent Hospital Discharge     PRESENTING HISTORY     Chief Complaint/Reason for Admission:  Follow up Hospital Discharge   PCP: Oral Alberts MD    History of Present Illness:  Mr. Raf Fine is a 84 y.o. male who was recently admitted to the hospital.    [facility] Hospital Medicine Discharge Summary  Primary Team: [facility] Hospitalist Team A  Attending Physician: Anuel Garcia MD  Resident: Dr. Hoang  Intern: Mohit  Date of Admit: 12/2/2019  Date of Discharge: 12/6/2019  Discharge to: home  Condition: stable  ___________________________________________________________________    Today:  [facility] for [facility] follow up.  Recently hospitalized for respiratory failure in setting new onset heart failure.  Initially required BiPap for respiratory support.   He responded well to diuresis.  Home medication regimen was adjusted.  Patient discharged to home with assistance of [facility].  Home supplemental oxygen arranged.    Patient accompanied today by family.  He is ambulatory with a single prong cane, independent with ADL's.  Reports compliance with medication.   Following sodium restricted diet.   Has cardiology consultation with [facility], Dr Hathaway, 12/13/19.     Wearing newly prescribed supplemental oxygen at 4 L NC.  96% at Rest on 4 L NC.  Desaturated to 84% on 4 L NC while ambulating to clinic restroom, became visibly short of breath with exertion.  Recovered to 92 % on 4 L NC after 3 min rest.   Former smoker, quit many years ago.    Review of Systems  General ROS: negative for chills, fever or weight loss  Psychological ROS: negative for hallucination, depression or suicidal ideation  Ophthalmic ROS: negative for blurry vision, photophobia or eye pain  ENT ROS: negative for epistaxis, sore throat or rhinorrhea  Respiratory ROS: no cough, shortness of breath, or wheezing  Cardiovascular ROS: no chest pain or dyspnea on  exertion  Gastrointestinal ROS: no abdominal pain, change in bowel habits, or black/ bloody stools  Genito-Urinary ROS: no dysuria, trouble voiding, or hematuria  Musculoskeletal ROS: negative for gait disturbance or muscular weakness  Neurological ROS: no syncope or seizures; no ataxia  Dermatological ROS: negative for pruritis, rash and jaundice      PAST HISTORY:     Past Medical History:   Diagnosis Date    Bundle branch block, 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      MEDICATIONS & ALLERGIES:     Current Outpatient Medications on File Prior to Visit   Medication Sig Dispense Refill    ACCU-CHEK EDITH PLUS TEST STRP Strp       ACCU-CHEK SOFTCLIX LANCETS Misc       amLODIPine (NORVASC) 10 MG tablet Take 1 tablet (10 mg total) by mouth once daily. 30 tablet 11    aspirin (ECOTRIN) 81 MG EC tablet Take 1 tablet (81 mg total) by mouth once daily. 30 tablet 0    B2/vits A,C,E/lut/zeaxanth/min (ICAPS ORAL) Take by mouth.      carvedilol (COREG) 6.25 MG tablet Take 1 tablet (6.25 mg total) by mouth 2 (two) times daily. 60 tablet 11    furosemide (LASIX) 80 MG tablet Take 1 tablet (80 mg total) by mouth once daily. 30 tablet 11    glipizide-metformin (METAGLIP) 2.5-500 mg per tablet TAKE 1 TABLET TWICE DAILY 180 tablet 3    JANUVIA 100 mg Tab TAKE 1 TABLET EVERY DAY ( NEED APPT WITH MD) 90 tablet 3    lisinopril (PRINIVIL,ZESTRIL) 40 MG tablet Take 1 tablet (40 mg total) by mouth once daily. 90 tablet 3    simvastatin (ZOCOR) 20 MG tablet TAKE 1 TABLET EVERY DAY 90 tablet 3    spironolactone (ALDACTONE) 25 MG tablet Take 1/2 tablets (12.5 mg total) by mouth once daily. 15 tablet 11     No current facility-administered medications on  file prior to visit.         Review of patient's allergies indicates:  No Known Allergies    OBJECTIVE:     Vital Signs:  /71 (BP Location: Right arm, Patient Position: Sitting, BP Method: Small (Automatic))   Pulse 76   Temp 97.1 °F (36.2 °C) (Oral)   Wt 90.5 kg (199 lb 8.3 oz)   SpO2 96%   BMI 27.83 kg/m²   Wt Readings from Last 1 Encounters:   12/06/19 0514 95.8 kg (211 lb 3.2 oz)   12/05/19 0508 96 kg (211 lb 10.3 oz)   12/04/19 0440 97.8 kg (215 lb 9.8 oz)   12/03/19 1740 98.8 kg (217 lb 13 oz)   12/02/19 2230 100.4 kg (221 lb 5.5 oz)   12/02/19 1248 104.5 kg (230 lb 6.1 oz)     There is no height or weight on file to calculate BMI.        Physical Exam:  /71 (BP Location: Right arm, Patient Position: Sitting, BP Method: Small (Automatic))   Pulse 76   Temp 97.1 °F (36.2 °C) (Oral)   Wt 90.5 kg (199 lb 8.3 oz)   SpO2 96%   BMI 27.83 kg/m²   General appearance: alert, cooperative, no distress  Constitutional:Oriented to person, place, and time  + appears well-developed and well-nourished.   HEENT: Normocephalic, atraumatic, neck symmetrical, no nasal discharge   Eyes: conjunctivae/corneas clear, PERRL, EOM's intact  Lungs: clear to auscultation bilaterally, no dullness to percussion bilaterally  Heart: regular rate and rhythm without rub; no displacement of the PMI   Abdomen: soft, non-tender; bowel sounds normoactive; no organomegaly  Extremities: extremities symmetric; no clubbing, cyanosis, or edema  Integument: Skin color, texture, turgor normal; no rashes; hair distrubution normal  Neurologic: Alert and oriented X 3, normal strength, normal coordination and gait  Psychiatric: no pressured speech; normal affect; no evidence of impaired cognition     Laboratory  Lab Results   Component Value Date    WBC 6.62 12/06/2019    HGB 17.2 12/06/2019    HCT 52.7 12/06/2019    MCV 95 12/06/2019     12/06/2019     BMP  Lab Results   Component Value Date     12/06/2019    K 3.4 (L)  12/06/2019    CL 96 12/06/2019    CO2 32 (H) 12/06/2019    BUN 23 12/06/2019    CREATININE 1.3 12/06/2019    CALCIUM 9.4 12/06/2019    ANIONGAP 12 12/06/2019    ESTGFRAFRICA 58 (A) 12/06/2019    EGFRNONAA 50 (A) 12/06/2019     Lab Results   Component Value Date    ALT 18 12/06/2019    AST 17 12/06/2019    ALKPHOS 81 12/06/2019    BILITOT 0.9 12/06/2019     Lab Results   Component Value Date    INR 1.1 12/03/2019    INR 1.1 12/02/2019    INR 0.9 08/31/2009     Lab Results   Component Value Date    HGBA1C 7.1 (H) 12/02/2019       Diagnostic Results:  2 D echo 12/3/19:  · Mildly decreased left ventricular systolic function. The estimated ejection fraction is 45%  · Concentric left ventricular remodeling.  · Left ventricular diastolic dysfunction.  · Mild aortic valve stenosis.  · Aortic valve area is 1.83 cm2; peak velocity is 1.85 m/s; mean gradient is 8 mmHg.  · Mild aortic regurgitation.  · Low normal right ventricular systolic function.  · Elevated central venous pressure (15 mm Hg).      ASSESSMENT & PLAN:     Acute systolic heart failure  - heart failure is new diagnosis  - on appropriate medical regimen  - appears euvolemic presently   - counseling and education provided today  - has Cardiology consultation, [facility], 12/13/19  -     TSH; Future; Expected date: 12/12/2019  -     Ambulatory consult to Cardiology    Hypoxia  - new requirement for supplemental oxygen, presented to clinic on 4L NC  - desaturates with minimal exertion  -     Complete PFT with bronchodilator; Future  -     PULSE OXIMETRY WITH REST - PULM; Future    Essential hypertension  - at goal    Type 2 diabetes mellitus without complication, without long-term current use of insulin  - HgBA1C 7.1    Patient received flu vaccine at community pharmacy.  I will [facility] 12/26/19.     Instructions for the patient:      Scheduled Follow-up :  Future Appointments   Date Time Provider Department Center   12/12/2019 12:30 PM  APPOINTMENT LAB, [facility]   12/20/2019 10:30 AM [facility] PULMONARY FUNCTION LAB [facility] PUL[facility]   12/26/2019  9:00 AM Whit Phan MD [facility]       Post Visit Medication List:     Medication List           Accurate as of December 12, 2019 12:14 PM. If you have any questions, ask your nurse or doctor.               CONTINUE taking these medications    Accu-Chek Lily Plus test strp Strp  Generic drug:  blood sugar diagnostic     Accu-Chek Softclix Lancets Misc  Generic drug:  lancets     amLODIPine 10 MG tablet  Commonly known as:  NORVASC  Take 1 tablet (10 mg total) by mouth once daily.     aspirin 81 MG EC tablet  Commonly known as:  ECOTRIN  Take 1 tablet (81 mg total) by mouth once daily.     carvedilol 6.25 MG tablet  Commonly known as:  COREG  Take 1 tablet (6.25 mg total) by mouth 2 (two) times daily.     furosemide 80 MG tablet  Commonly known as:  LASIX  Take 1 tablet (80 mg total) by mouth once daily.     glipizide-metformin 2.5-500 mg per tablet  Commonly known as:  METAGLIP  TAKE 1 TABLET TWICE DAILY     ICAPS ORAL     Januvia 100 MG Tab  Generic drug:  SITagliptin  TAKE 1 TABLET EVERY DAY ( NEED APPT WITH MD)     lisinopril 40 MG tablet  Commonly known as:  PRINIVIL,ZESTRIL  Take 1 tablet (40 mg total) by mouth once daily.     simvastatin 20 MG tablet  Commonly known as:  ZOCOR  TAKE 1 TABLET EVERY DAY     spironolactone 25 MG tablet  Commonly known as:  ALDACTONE  Take 1/2 tablets (12.5 mg total) by mouth once daily.            Signing Physician:  Whit Phan MD

## 2019-12-16 ENCOUNTER — EXTERNAL HOME HEALTH (OUTPATIENT)
Dept: HOME HEALTH SERVICES | Facility: HOSPITAL | Age: 84
End: 2019-12-16

## 2019-12-18 ENCOUNTER — TELEPHONE (OUTPATIENT)
Dept: PRIMARY CARE CLINIC | Facility: CLINIC | Age: 84
End: 2019-12-18

## 2019-12-18 NOTE — TELEPHONE ENCOUNTER
----- Message from Whit Phan MD sent at 12/18/2019  4:18 PM CST -----  Great, thank you, can document this in a telephone encounter note so this information shows up in his chart for others to see? Thanks.   ----- Message -----  From: Lynda Vargas MA  Sent: 12/18/2019   4:07 PM CST  To: Whit Phan MD    Spoke with Renea she stated any activity he walked 25 feet and his O2 went down to 78. It took him over ten minutes to recovery. Renea said she would recheck him in two weeks because he has a Pulmonology appointment on 12/20 at 10:30AM.   ----- Message -----  From: Whit Phan MD  Sent: 12/18/2019   3:50 PM CST  To: Shaye Stockton RN, Lynda Vargas MA, #    [facility]  team (Renea [phone]) called to say he is not tolerating PT at home and they are putting therapy services on hold. Please call to get more info. Why is he not tolerating therapy, is it oxygen desaturation or something else?

## 2019-12-18 NOTE — TELEPHONE ENCOUNTER
Spoke with Renea she stated any activity he walked 25 feet and his O2 went down to 78. It took him over ten minutes to recovery. Renea said she would recheck him in two weeks because he has a Pulmonology appointment on 12/20 at 10:30AM

## 2019-12-20 ENCOUNTER — HOSPITAL ENCOUNTER (OUTPATIENT)
Dept: PULMONOLOGY | Facility: HOSPITAL | Age: 84
Discharge: HOME | End: 2019-12-20
Attending: INTERNAL MEDICINE
Payer: MEDICARE

## 2019-12-20 DIAGNOSIS — R09.02 HYPOXIA: ICD-10-CM

## 2019-12-20 LAB
BRPFT: ABNORMAL
BRPFT: ABNORMAL
DLCO ADJ PRE: 5.13 ML/(MIN*MMHG) (ref 18.42–32.28)
DLCO SINGLE BREATH LLN: 18.42
DLCO SINGLE BREATH PRE REF: 20.2 %
DLCO SINGLE BREATH REF: 25.35
DLCOC SBVA LLN: 2.37
DLCOC SBVA PRE REF: 54 %
DLCOC SBVA REF: 3.47
DLCOC SINGLE BREATH LLN: 18.42
DLCOC SINGLE BREATH PRE REF: 20.2 %
DLCOC SINGLE BREATH REF: 25.35
DLCOVA LLN: 2.37
DLCOVA PRE REF: 54 %
DLCOVA PRE: 1.88 ML/(MIN*MMHG*L) (ref 2.37–4.57)
DLCOVA REF: 3.47
DLVAADJ PRE: 1.88 ML/(MIN*MMHG*L) (ref 2.37–4.57)
ERVN2 LLN: 0.92
ERVN2 PRE REF: 48.7 %
ERVN2 PRE: 0.45 L (ref 0.92–0.92)
ERVN2 REF: 0.92
FEF 25 75 LLN: 0.69
FEF 25 75 PRE REF: 43.9 %
FEF 25 75 REF: 1.94
FEV1 FVC LLN: 59
FEV1 FVC PRE REF: 95.5 %
FEV1 FVC REF: 74
FEV1 LLN: 1.94
FEV1 PRE REF: 49.3 %
FEV1 REF: 2.84
FRCN2 LLN: 2.88
FRCN2 PRE REF: 55.1 %
FRCN2 REF: 3.87
FVC LLN: 2.77
FVC PRE REF: 51 %
FVC REF: 3.88
IVC PRE: 1.86 L (ref 2.77–4.99)
IVC SINGLE BREATH LLN: 2.77
IVC SINGLE BREATH PRE REF: 48 %
IVC SINGLE BREATH REF: 3.88
PEF LLN: 4.67
PEF PRE REF: 30 %
PEF REF: 7.04
PRE DLCO: 5.13 ML/(MIN*MMHG) (ref 18.42–32.28)
PRE FEF 25 75: 0.85 L/S (ref 0.69–3.18)
PRE FET 100: 7.24 SEC
PRE FEV1 FVC: 70.71 % (ref 58.8–89.3)
PRE FEV1: 1.4 L (ref 1.94–3.74)
PRE FRC N2: 2.13 L
PRE FVC: 1.98 L (ref 2.77–4.99)
PRE PEF: 2.11 L/S (ref 4.67–9.42)
RVN2 LLN: 2.28
RVN2 PRE REF: 37.4 %
RVN2 PRE: 1.1 L (ref 2.28–3.63)
RVN2 REF: 2.95
RVN2TLCN2 LLN: 37.35
RVN2TLCN2 PRE REF: 75.6 %
RVN2TLCN2 PRE: 35.05 % (ref 37.35–55.31)
RVN2TLCN2 REF: 46.33
TLCN2 LLN: 6.15
TLCN2 PRE REF: 43.1 %
TLCN2 PRE: 3.15 L (ref 6.15–8.45)
TLCN2 REF: 7.3
VA PRE: 2.73 L (ref 7.15–7.15)
VA SINGLE BREATH LLN: 7.15
VA SINGLE BREATH PRE REF: 38.2 %
VA SINGLE BREATH REF: 7.15
VCMAXN2 LLN: 2.77
VCMAXN2 PRE REF: 52.7 %
VCMAXN2 PRE: 2.05 L (ref 2.77–4.99)
VCMAXN2 REF: 3.88

## 2019-12-20 PROCEDURE — 94727 GAS DIL/WSHOT DETER LNG VOL: CPT | Mod: HCNC

## 2019-12-20 PROCEDURE — 94010 BREATHING CAPACITY TEST: CPT | Mod: HCNC

## 2019-12-20 PROCEDURE — 94760 N-INVAS EAR/PLS OXIMETRY 1: CPT | Mod: HCNC

## 2019-12-20 PROCEDURE — 94729 DIFFUSING CAPACITY: CPT | Mod: HCNC

## 2019-12-26 ENCOUNTER — OFFICE VISIT (OUTPATIENT)
Dept: PRIMARY CARE CLINIC | Facility: CLINIC | Age: 84
End: 2019-12-26
Payer: MEDICARE

## 2019-12-26 VITALS
BODY MASS INDEX: 28.78 KG/M2 | SYSTOLIC BLOOD PRESSURE: 114 MMHG | TEMPERATURE: 97 F | OXYGEN SATURATION: 91 % | DIASTOLIC BLOOD PRESSURE: 65 MMHG | WEIGHT: 206.38 LBS | HEART RATE: 68 BPM

## 2019-12-26 DIAGNOSIS — J98.4 RESTRICTIVE LUNG DISEASE: ICD-10-CM

## 2019-12-26 DIAGNOSIS — I50.42 CHRONIC COMBINED SYSTOLIC (CONGESTIVE) AND DIASTOLIC (CONGESTIVE) HEART FAILURE: Primary | ICD-10-CM

## 2019-12-26 DIAGNOSIS — Z99.81 SUPPLEMENTAL OXYGEN DEPENDENT: ICD-10-CM

## 2019-12-26 PROCEDURE — 3074F PR MOST RECENT SYSTOLIC BLOOD PRESSURE < 130 MM HG: ICD-10-PCS | Mod: HCNC,CPTII,S$GLB, | Performed by: INTERNAL MEDICINE

## 2019-12-26 PROCEDURE — 99499 RISK ADDL DX/OHS AUDIT: ICD-10-PCS | Mod: HCNC,S$GLB,, | Performed by: INTERNAL MEDICINE

## 2019-12-26 PROCEDURE — 1101F PR PT FALLS ASSESS DOC 0-1 FALLS W/OUT INJ PAST YR: ICD-10-PCS | Mod: HCNC,CPTII,S$GLB, | Performed by: INTERNAL MEDICINE

## 2019-12-26 PROCEDURE — 3078F PR MOST RECENT DIASTOLIC BLOOD PRESSURE < 80 MM HG: ICD-10-PCS | Mod: HCNC,CPTII,S$GLB, | Performed by: INTERNAL MEDICINE

## 2019-12-26 PROCEDURE — 99214 OFFICE O/P EST MOD 30 MIN: CPT | Mod: HCNC,S$GLB,, | Performed by: INTERNAL MEDICINE

## 2019-12-26 PROCEDURE — 1159F MED LIST DOCD IN RCRD: CPT | Mod: HCNC,S$GLB,, | Performed by: INTERNAL MEDICINE

## 2019-12-26 PROCEDURE — 3074F SYST BP LT 130 MM HG: CPT | Mod: HCNC,CPTII,S$GLB, | Performed by: INTERNAL MEDICINE

## 2019-12-26 PROCEDURE — 99999 PR PBB SHADOW E&M-EST. PATIENT-LVL III: ICD-10-PCS | Mod: PBBFAC,HCNC,, | Performed by: INTERNAL MEDICINE

## 2019-12-26 PROCEDURE — 99214 PR OFFICE/OUTPT VISIT, EST, LEVL IV, 30-39 MIN: ICD-10-PCS | Mod: HCNC,S$GLB,, | Performed by: INTERNAL MEDICINE

## 2019-12-26 PROCEDURE — 1159F PR MEDICATION LIST DOCUMENTED IN MEDICAL RECORD: ICD-10-PCS | Mod: HCNC,S$GLB,, | Performed by: INTERNAL MEDICINE

## 2019-12-26 PROCEDURE — 99499 UNLISTED E&M SERVICE: CPT | Mod: HCNC,S$GLB,, | Performed by: INTERNAL MEDICINE

## 2019-12-26 PROCEDURE — 1126F AMNT PAIN NOTED NONE PRSNT: CPT | Mod: HCNC,S$GLB,, | Performed by: INTERNAL MEDICINE

## 2019-12-26 PROCEDURE — 1126F PR PAIN SEVERITY QUANTIFIED, NO PAIN PRESENT: ICD-10-PCS | Mod: HCNC,S$GLB,, | Performed by: INTERNAL MEDICINE

## 2019-12-26 PROCEDURE — 99999 PR PBB SHADOW E&M-EST. PATIENT-LVL III: CPT | Mod: PBBFAC,HCNC,, | Performed by: INTERNAL MEDICINE

## 2019-12-26 PROCEDURE — 1101F PT FALLS ASSESS-DOCD LE1/YR: CPT | Mod: HCNC,CPTII,S$GLB, | Performed by: INTERNAL MEDICINE

## 2019-12-26 PROCEDURE — 3078F DIAST BP <80 MM HG: CPT | Mod: HCNC,CPTII,S$GLB, | Performed by: INTERNAL MEDICINE

## 2019-12-26 RX ORDER — TORSEMIDE 20 MG/1
20 TABLET ORAL 2 TIMES DAILY
Refills: 0 | COMMUNITY
Start: 2019-12-13 | End: 2019-12-26

## 2019-12-26 RX ORDER — TORSEMIDE 20 MG/1
40 TABLET ORAL DAILY
Refills: 0
Start: 2019-12-26 | End: 2019-12-28

## 2019-12-26 NOTE — PROGRESS NOTES
Subjective:       Patient ID: Raf Fine is a 84 y.o. male.    Chief Complaint: Hospital Follow Up    HPI:  [facility] to [facility] for [facility] follow up.  Recently hospitalized for respiratory failure in setting new onset heart failure.  Initially required BiPap for respiratory support.   He responded well to diuresis.  Home medication regimen was adjusted.  Patient discharged to home with assistance of [facility].  Home supplemental oxygen arranged.    Patient accompanied today by family.  He is ambulatory at home with a single prong cane, but in wheelchair today for office visit.  Independent with ADL's.    Had cardiology consultation with Dr Festus Vasquez ([facility]) 12/13/19.   Lasix discontinued.  Torsemide 40 mg twice daily X 7 days prescribed, with orders to decrease to 40 mg once daily thereafter.   Today, weight up 7 lbs since last visit on 12/12/19, but this does not correlate with his home records.  Weights are stable on his home scale and have remained essentially unchanged since hospital discharge.  [facility] team had labs drawn at [facility] on 12/20/19. We are requesting a copy.    Completed PFT's on 12/20/19- appears to be restrictive lung disease.  Still wearing newly prescribed home oxygen.   Has never had pulmonary evaluation.     Review of Systems   Constitutional: Negative for chills and fever.   HENT: Negative for hearing loss.    Eyes: Negative for blurred vision.   Respiratory: Positive for shortness of breath. Negative for cough, sputum production and wheezing.    Cardiovascular: Negative for chest pain, orthopnea, leg swelling and PND.   Gastrointestinal: Negative for heartburn, nausea and vomiting.   Genitourinary: Negative for dysuria.   Musculoskeletal: Negative for falls and myalgias.   Skin: Negative for rash.   Neurological: Positive for sensory change (left knee, thigh). Negative for dizziness.   Endo/Heme/Allergies: Bruises/bleeds easily.   Psychiatric/Behavioral:  Negative for depression and memory loss. The patient is not nervous/anxious and does not have insomnia.          Objective:      Vital Signs:  Vitals:    12/26/19 0912   BP: 114/65   Pulse: 68   Temp: 96.9 °F (36.1 °C)     Wt Readings from Last 1 Encounters:   12/26/19 0912 93.6 kg (206 lb 5.6 oz)     Body mass index is 28.78 kg/m².   SpO2 Readings from Last 1 Encounters:   12/12/19 96%       Physical Exam   Constitutional: He is oriented to person, place, and time. He appears well-developed and well-nourished. No distress.   HENT:   Head: Normocephalic.   Eyes: Pupils are equal, round, and reactive to light.   Neck: Normal range of motion. No JVD present.   Cardiovascular: Normal rate.   No murmur heard.  Pulmonary/Chest: Effort normal. No respiratory distress. He has no wheezes.   + mild crackles at bases, bilaterally    Abdominal: Soft. Bowel sounds are normal. He exhibits no distension.   Musculoskeletal: Normal range of motion. He exhibits no edema.   Neurological: He is alert and oriented to person, place, and time.   Skin: Skin is warm and dry. No rash noted.   Psychiatric: He has a normal mood and affect.           Assessment:       1. Chronic combined systolic (congestive) and diastolic (congestive) heart failure    2. Restrictive lung disease    3. Supplemental oxygen dependent        Plan:       Diagnoses and all orders for this visit:    Chronic combined systolic (congestive) and diastolic (congestive) heart failure  - new diagnosis  - followed by [facility]"" Cardiology - Dr Festus Vasquez, will be seen again 12/27/19  - on appropriate medical therapy presently  - recent change from Lasix to Demadex   - weight stable per home records    Restrictive lung disease  Supplemental oxygen dependent  - newly prescribed supplemental oxygen  - PFT's abnormal  -     Ambulatory consult to Pulmonology    Other orders  -     torsemide (DEMADEX) 20 MG Tab; Take 2 tablets (40 mg total) by mouth once daily.        I will see  patient again in [facility] 2/4/19.       Scheduled Follow-up :  Future Appointments   Date Time Provider Department Center   1/29/2020 10:30 AM Carito Fuller DNP [facility]   2/4/2020 10:30 AM Whit Phan MD [facility]       Post Visit Medication List:     Medication List           Accurate as of December 26, 2019 10:09 AM. If you have any questions, ask your nurse or doctor.               CHANGE how you take these medications    torsemide 20 MG Tab  Commonly known as:  DEMADEX  Take 2 tablets (40 mg total) by mouth once daily.  What changed:    · how much to take  · when to take this  Changed by:  Whit Phan MD        CONTINUE taking these medications    Accu-Chek Lily Plus test strp Strp  Generic drug:  blood sugar diagnostic     Accu-Chek Softclix Lancets Misc  Generic drug:  lancets     amLODIPine 10 MG tablet  Commonly known as:  NORVASC  Take 1 tablet (10 mg total) by mouth once daily.     aspirin 81 MG EC tablet  Commonly known as:  ECOTRIN  Take 1 tablet (81 mg total) by mouth once daily.     carvedilol 6.25 MG tablet  Commonly known as:  COREG  Take 1 tablet (6.25 mg total) by mouth 2 (two) times daily.     glipizide-metformin 2.5-500 mg per tablet  Commonly known as:  METAGLIP  TAKE 1 TABLET TWICE DAILY     ICAPS ORAL     Januvia 100 MG Tab  Generic drug:  SITagliptin  TAKE 1 TABLET EVERY DAY ( NEED APPT WITH MD)     lisinopril 40 MG tablet  Commonly known as:  PRINIVIL,ZESTRIL  Take 1 tablet (40 mg total) by mouth once daily.     simvastatin 20 MG tablet  Commonly known as:  ZOCOR  TAKE 1 TABLET EVERY DAY     spironolactone 25 MG tablet  Commonly known as:  ALDACTONE  Take 1/2 tablets (12.5 mg total) by mouth once daily.        STOP taking these medications    furosemide 80 MG tablet  Commonly known as:  LASIX  Stopped by:  Whit Phan MD           Where to Get Your Medications      Information about where to get these medications is not yet  available    Ask your nurse or doctor about these medications  · torsemide 20 MG Tab

## 2019-12-27 ENCOUNTER — LAB VISIT (OUTPATIENT)
Dept: LAB | Facility: HOSPITAL | Age: 84
End: 2019-12-27
Attending: INTERNAL MEDICINE
Payer: MEDICARE

## 2019-12-27 DIAGNOSIS — I50.9 HEART FAILURE: ICD-10-CM

## 2019-12-27 DIAGNOSIS — I10 HYPERTENSION: ICD-10-CM

## 2019-12-27 DIAGNOSIS — I50.9 CHF (CONGESTIVE HEART FAILURE): Primary | ICD-10-CM

## 2019-12-27 DIAGNOSIS — I50.30 NYHA CLASS 3 HEART FAILURE WITH BORDERLINE PRESERVED EJECTION FRACTION: ICD-10-CM

## 2019-12-27 LAB
ANION GAP SERPL CALC-SCNC: 14 MMOL/L (ref 8–16)
BNP SERPL-MCNC: 167 PG/ML (ref 0–99)
BUN SERPL-MCNC: 72 MG/DL (ref 8–23)
CALCIUM SERPL-MCNC: 9.6 MG/DL (ref 8.7–10.5)
CHLORIDE SERPL-SCNC: 103 MMOL/L (ref 95–110)
CO2 SERPL-SCNC: 25 MMOL/L (ref 23–29)
CREAT SERPL-MCNC: 2.2 MG/DL (ref 0.5–1.4)
EST. GFR  (AFRICAN AMERICAN): 31 ML/MIN/1.73 M^2
EST. GFR  (NON AFRICAN AMERICAN): 27 ML/MIN/1.73 M^2
GLUCOSE SERPL-MCNC: 117 MG/DL (ref 70–110)
POTASSIUM SERPL-SCNC: 5.1 MMOL/L (ref 3.5–5.1)
SODIUM SERPL-SCNC: 142 MMOL/L (ref 136–145)

## 2019-12-27 PROCEDURE — 36415 COLL VENOUS BLD VENIPUNCTURE: CPT | Mod: HCNC

## 2019-12-27 PROCEDURE — 80048 BASIC METABOLIC PNL TOTAL CA: CPT | Mod: HCNC

## 2019-12-27 PROCEDURE — 83880 ASSAY OF NATRIURETIC PEPTIDE: CPT | Mod: HCNC

## 2020-01-06 ENCOUNTER — PATIENT MESSAGE (OUTPATIENT)
Dept: PRIMARY CARE CLINIC | Facility: CLINIC | Age: 85
End: 2020-01-06

## 2020-01-17 ENCOUNTER — LAB VISIT (OUTPATIENT)
Dept: LAB | Facility: HOSPITAL | Age: 85
End: 2020-01-17
Attending: INTERNAL MEDICINE
Payer: MEDICARE

## 2020-01-17 DIAGNOSIS — I50.9 CHF (CONGESTIVE HEART FAILURE): ICD-10-CM

## 2020-01-17 DIAGNOSIS — I50.20 HEART FAILURE, SYSTOLIC: ICD-10-CM

## 2020-01-17 DIAGNOSIS — I10 HTN (HYPERTENSION): Primary | ICD-10-CM

## 2020-01-17 LAB
ANION GAP SERPL CALC-SCNC: 11 MMOL/L (ref 8–16)
BNP SERPL-MCNC: 369 PG/ML (ref 0–99)
BUN SERPL-MCNC: 29 MG/DL (ref 8–23)
CALCIUM SERPL-MCNC: 9.6 MG/DL (ref 8.7–10.5)
CHLORIDE SERPL-SCNC: 102 MMOL/L (ref 95–110)
CO2 SERPL-SCNC: 25 MMOL/L (ref 23–29)
CREAT SERPL-MCNC: 1.4 MG/DL (ref 0.5–1.4)
EST. GFR  (AFRICAN AMERICAN): 53 ML/MIN/1.73 M^2
EST. GFR  (NON AFRICAN AMERICAN): 46 ML/MIN/1.73 M^2
GLUCOSE SERPL-MCNC: 113 MG/DL (ref 70–110)
POTASSIUM SERPL-SCNC: 4.7 MMOL/L (ref 3.5–5.1)
SODIUM SERPL-SCNC: 138 MMOL/L (ref 136–145)

## 2020-01-17 PROCEDURE — 83880 ASSAY OF NATRIURETIC PEPTIDE: CPT | Mod: HCNC

## 2020-01-17 PROCEDURE — 80048 BASIC METABOLIC PNL TOTAL CA: CPT | Mod: HCNC

## 2020-01-17 PROCEDURE — 36415 COLL VENOUS BLD VENIPUNCTURE: CPT | Mod: HCNC

## 2020-01-22 ENCOUNTER — TELEPHONE (OUTPATIENT)
Dept: PULMONOLOGY | Facility: CLINIC | Age: 85
End: 2020-01-22

## 2020-01-22 DIAGNOSIS — J98.4 RESTRICTIVE LUNG DISEASE: Primary | ICD-10-CM

## 2020-01-23 ENCOUNTER — HOSPITAL ENCOUNTER (OUTPATIENT)
Dept: PULMONOLOGY | Facility: CLINIC | Age: 85
Discharge: HOME | End: 2020-01-23
Payer: MEDICARE

## 2020-01-23 ENCOUNTER — OFFICE VISIT (OUTPATIENT)
Dept: PULMONOLOGY | Facility: CLINIC | Age: 85
End: 2020-01-23
Payer: MEDICARE

## 2020-01-23 VITALS
HEIGHT: 68 IN | OXYGEN SATURATION: 96 % | DIASTOLIC BLOOD PRESSURE: 78 MMHG | BODY MASS INDEX: 31.67 KG/M2 | HEART RATE: 71 BPM | WEIGHT: 209 LBS | SYSTOLIC BLOOD PRESSURE: 138 MMHG

## 2020-01-23 DIAGNOSIS — J98.4 RESTRICTIVE LUNG DISEASE: ICD-10-CM

## 2020-01-23 DIAGNOSIS — J98.4 RESTRICTIVE LUNG DISEASE: Primary | ICD-10-CM

## 2020-01-23 DIAGNOSIS — Z99.81 SUPPLEMENTAL OXYGEN DEPENDENT: ICD-10-CM

## 2020-01-23 LAB
DLCO ADJ PRE: 5.5 ML/(MIN*MMHG) (ref 15.9–29.76)
DLCO SINGLE BREATH LLN: 15.9
DLCO SINGLE BREATH PRE REF: 25.7 %
DLCO SINGLE BREATH REF: 22.83
DLCOC SBVA LLN: 2.2
DLCOC SBVA PRE REF: 51.2 %
DLCOC SBVA REF: 3.39
DLCOC SINGLE BREATH LLN: 15.9
DLCOC SINGLE BREATH PRE REF: 24.1 %
DLCOC SINGLE BREATH REF: 22.83
DLCOCSBVAULN: 4.57
DLCOCSINGLEBREATHULN: 29.76
DLCOSINGLEBREATHULN: 29.76
DLCOVA LLN: 2.2
DLCOVA PRE REF: 54.6 %
DLCOVA PRE: 1.85 ML/(MIN*MMHG*L) (ref 2.2–4.57)
DLCOVA REF: 3.39
DLCOVAULN: 4.57
DLVAADJ PRE: 1.73 ML/(MIN*MMHG*L) (ref 2.2–4.57)
ERVN2 LLN: 0.83
ERVN2 PRE REF: 106 %
ERVN2 PRE: 0.88 L (ref 0.83–0.83)
ERVN2 REF: 0.83
ERVN2ULN: 0.83
FEF 25 75 LLN: 0.65
FEF 25 75 PRE REF: 55.6 %
FEF 25 75 REF: 1.81
FEV05 LLN: 1.09
FEV05 REF: 2.23
FEV1 FVC LLN: 59
FEV1 FVC PRE REF: 94 %
FEV1 FVC REF: 75
FEV1 LLN: 1.77
FEV1 PRE REF: 61.5 %
FEV1 REF: 2.6
FRCN2 LLN: 2.73
FRCN2 PRE REF: 44.2 %
FRCN2 REF: 3.71
FRCN2ULN: 4.7
FVC LLN: 2.51
FVC PRE REF: 64.8 %
FVC REF: 3.52
IVC PRE: 2.16 L (ref 2.51–4.53)
IVC SINGLE BREATH LLN: 2.51
IVC SINGLE BREATH PRE REF: 61.2 %
IVC SINGLE BREATH REF: 3.52
IVCSINGLEBREATHULN: 4.53
PEF LLN: 4.1
PEF PRE REF: 61.9 %
PEF REF: 6.29
PHYSICIAN COMMENT: ABNORMAL
PRE DLCO: 5.87 ML/(MIN*MMHG) (ref 15.9–29.76)
PRE FEF 25 75: 1 L/S (ref 0.65–2.97)
PRE FET 100: 6.98 SEC
PRE FEV05 REF: 58.8 %
PRE FEV1 FVC: 70.03 % (ref 59.15–89.87)
PRE FEV1: 1.6 L (ref 1.77–3.42)
PRE FEV5: 1.31 L (ref 1.09–3.36)
PRE FRC N2: 1.64 L
PRE FVC: 2.28 L (ref 2.51–4.53)
PRE PEF: 3.9 L/S (ref 4.1–8.48)
RVN2 LLN: 2.21
RVN2 PRE REF: 26.3 %
RVN2 PRE: 0.76 L (ref 2.21–3.56)
RVN2 REF: 2.88
RVN2TLCN2 LLN: 37.74
RVN2TLCN2 PRE REF: 54.2 %
RVN2TLCN2 PRE: 25.33 % (ref 37.74–55.7)
RVN2TLCN2 REF: 46.72
RVN2TLCN2ULN: 55.7
RVN2ULN: 3.56
TLCN2 LLN: 5.59
TLCN2 PRE REF: 44.5 %
TLCN2 PRE: 3 L (ref 5.59–7.89)
TLCN2 REF: 6.74
TLCN2ULN: 7.89
VA PRE: 3.17 L (ref 6.59–6.59)
VA SINGLE BREATH LLN: 6.59
VA SINGLE BREATH PRE REF: 48.1 %
VA SINGLE BREATH REF: 6.59
VASINGLEBREATHULN: 6.59
VCMAXN2 LLN: 2.51
VCMAXN2 PRE REF: 63.6 %
VCMAXN2 PRE: 2.24 L (ref 2.51–4.53)
VCMAXN2 REF: 3.52
VCMAXN2ULN: 4.53

## 2020-01-23 PROCEDURE — 94727 PR PULM FUNCTION TEST BY GAS: ICD-10-PCS | Mod: HCNC,S$GLB,, | Performed by: INTERNAL MEDICINE

## 2020-01-23 PROCEDURE — 99204 OFFICE O/P NEW MOD 45 MIN: CPT | Mod: HCNC,S$GLB,, | Performed by: INTERNAL MEDICINE

## 2020-01-23 PROCEDURE — 1126F AMNT PAIN NOTED NONE PRSNT: CPT | Mod: HCNC,S$GLB,, | Performed by: INTERNAL MEDICINE

## 2020-01-23 PROCEDURE — 94727 GAS DIL/WSHOT DETER LNG VOL: CPT | Mod: HCNC,S$GLB,, | Performed by: INTERNAL MEDICINE

## 2020-01-23 PROCEDURE — 99499 UNLISTED E&M SERVICE: CPT | Mod: HCNC,S$GLB,, | Performed by: NURSE PRACTITIONER

## 2020-01-23 PROCEDURE — 1159F MED LIST DOCD IN RCRD: CPT | Mod: HCNC,S$GLB,, | Performed by: INTERNAL MEDICINE

## 2020-01-23 PROCEDURE — 94010 BREATHING CAPACITY TEST: CPT | Mod: HCNC,S$GLB,, | Performed by: INTERNAL MEDICINE

## 2020-01-23 PROCEDURE — 1159F PR MEDICATION LIST DOCUMENTED IN MEDICAL RECORD: ICD-10-PCS | Mod: HCNC,S$GLB,, | Performed by: INTERNAL MEDICINE

## 2020-01-23 PROCEDURE — 1126F PR PAIN SEVERITY QUANTIFIED, NO PAIN PRESENT: ICD-10-PCS | Mod: HCNC,S$GLB,, | Performed by: INTERNAL MEDICINE

## 2020-01-23 PROCEDURE — 3075F PR MOST RECENT SYSTOLIC BLOOD PRESS GE 130-139MM HG: ICD-10-PCS | Mod: HCNC,CPTII,S$GLB, | Performed by: INTERNAL MEDICINE

## 2020-01-23 PROCEDURE — 94729 PR C02/MEMBANE DIFFUSE CAPACITY: ICD-10-PCS | Mod: HCNC,S$GLB,, | Performed by: INTERNAL MEDICINE

## 2020-01-23 PROCEDURE — 99499 RISK ADDL DX/OHS AUDIT: ICD-10-PCS | Mod: HCNC,S$GLB,, | Performed by: NURSE PRACTITIONER

## 2020-01-23 PROCEDURE — 1101F PT FALLS ASSESS-DOCD LE1/YR: CPT | Mod: HCNC,CPTII,S$GLB, | Performed by: INTERNAL MEDICINE

## 2020-01-23 PROCEDURE — 94010 BREATHING CAPACITY TEST: ICD-10-PCS | Mod: HCNC,S$GLB,, | Performed by: INTERNAL MEDICINE

## 2020-01-23 PROCEDURE — 3075F SYST BP GE 130 - 139MM HG: CPT | Mod: HCNC,CPTII,S$GLB, | Performed by: INTERNAL MEDICINE

## 2020-01-23 PROCEDURE — 3078F PR MOST RECENT DIASTOLIC BLOOD PRESSURE < 80 MM HG: ICD-10-PCS | Mod: HCNC,CPTII,S$GLB, | Performed by: INTERNAL MEDICINE

## 2020-01-23 PROCEDURE — 99999 PR PBB SHADOW E&M-EST. PATIENT-LVL III: ICD-10-PCS | Mod: PBBFAC,HCNC,, | Performed by: INTERNAL MEDICINE

## 2020-01-23 PROCEDURE — 99999 PR PBB SHADOW E&M-EST. PATIENT-LVL III: CPT | Mod: PBBFAC,HCNC,, | Performed by: INTERNAL MEDICINE

## 2020-01-23 PROCEDURE — 3078F DIAST BP <80 MM HG: CPT | Mod: HCNC,CPTII,S$GLB, | Performed by: INTERNAL MEDICINE

## 2020-01-23 PROCEDURE — 99204 PR OFFICE/OUTPT VISIT, NEW, LEVL IV, 45-59 MIN: ICD-10-PCS | Mod: HCNC,S$GLB,, | Performed by: INTERNAL MEDICINE

## 2020-01-23 PROCEDURE — 1101F PR PT FALLS ASSESS DOC 0-1 FALLS W/OUT INJ PAST YR: ICD-10-PCS | Mod: HCNC,CPTII,S$GLB, | Performed by: INTERNAL MEDICINE

## 2020-01-23 PROCEDURE — 94729 DIFFUSING CAPACITY: CPT | Mod: HCNC,S$GLB,, | Performed by: INTERNAL MEDICINE

## 2020-01-23 NOTE — PROGRESS NOTES
Subjective:       Patient ID: Raf Fine is a 85 y.o. male.    Chief Complaint: Shortness of Breath    HPI:   Raf Fine is a 85 y.o. male who presents with shortness of breath and abnormal CXR. Reports he was in his usual state health prior to his recent hospitalization in 2019 for acute resp failure secondary to heart failure. Explains was able to walk without assistance did not require oxygen and performed all activities of daily living.    Now requires oxygen therapy and experiences shortness of breath with minimal exertion. He is followed by cardiology and explains may undergo angiogram. However, his CXR showed restriction and referred to pulmonary for further evaluation.   Worked for [employer] do an in and out jobs many years.  Denies any asbestos exposure.  Grew up in [place].  Reports smoking history 1 pack times 50 years.  Quit 14 years ago.  He denies cough or wheezing.  He denies fever or chills.  He denies neuromuscular disease.  Denies use of  amiodarone in the past.     Review of Systems   Constitutional: Negative for fever, chills, weight loss and night sweats.   HENT: Negative for postnasal drip, rhinorrhea, trouble swallowing and congestion.    Respiratory: Positive for shortness of breath and dyspnea on extertion. Negative for cough, sputum production, choking, chest tightness, wheezing and use of rescue inhaler.    Cardiovascular: Negative for chest pain and leg swelling.   Musculoskeletal: Negative for joint swelling.   Skin: Negative for rash.   Gastrointestinal: Negative for acid reflux.   Neurological: Negative for dizziness and light-headedness.   Hematological: Negative for adenopathy.   Psychiatric/Behavioral: Negative for sleep disturbance.         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    aspirin (ECOTRIN) 81 MG EC tablet Take 1 tablet (81 mg total) by mouth once daily.    B2/vits A,C,E/lut/zeaxanth/min (ICAPS ORAL) Take by mouth.    carvedilol (COREG) 6.25 MG tablet Take 1 tablet (6.25 mg total) by mouth 2 (two) times daily.    glipizide-metformin (METAGLIP) 2.5-500 mg per tablet TAKE 1 TABLET TWICE DAILY    JANUVIA 100 mg Tab TAKE 1 TABLET EVERY DAY ( NEED APPT WITH MD)    lisinopril (PRINIVIL,ZESTRIL) 40 MG tablet Take 1 tablet (40 mg total) by mouth once daily.    simvastatin (ZOCOR) 20 MG tablet TAKE 1 TABLET EVERY DAY    spironolactone (ALDACTONE) 25 MG tablet Take 1/2 tablets (12.5 mg total) by mouth once daily.    [DISCONTINUED] torsemide (DEMADEX) 20 MG Tab Take 2 tablets (40 mg total) by mouth once daily.     No current facility-administered medications on file prior to visit.      Objective:      Vitals:    01/23/20 1307   BP: 138/78   Pulse: 71   SpO2: 96%   Weight: 94.8 kg (209 lb)   Height: 5' 8" (1.727 m)     Physical Exam   Constitutional: He is oriented to person, place, and time. He appears well-developed and well-nourished. No distress.   HENT:   Head: Normocephalic.   Neck: Normal range of motion. Neck supple.   Cardiovascular: Normal rate, regular rhythm and normal " heart sounds.   Pulmonary/Chest: Normal expansion and effort normal. No respiratory distress. He has no wheezes. He has no rhonchi. He has rales (Noted to bilateral lower lobes. ).   On 4 L per NC.    Abdominal: Soft. Bowel sounds are normal. There is no tenderness.   Musculoskeletal: Normal range of motion. He exhibits no edema.   Lymphadenopathy: No supraclavicular adenopathy is present.     He has no cervical adenopathy.   Neurological: He is alert and oriented to person, place, and time. Gait normal.   Skin: Skin is warm and dry. Nails show no clubbing.   Psychiatric: He has a normal mood and affect. His behavior is normal.   Vitals reviewed.    Personal Diagnostic Review    Spirometry 1/23/2020- Spirometry shows airflow is normal. Lung volumes show severe restriction. DLCO is severely decreased.    CXR 12/3/2019- FINDINGS:  There are bilateral perihilar opacities with interstitial prominence similar previous performed 1 day prior.  There is no pneumothorax.  The remainder of the examination is unchanged.    Results for DARRELL BARNETT (MRN [id number]) as of 1/26/2020 14:20   Ref. Range 1/17/2020 09:50   Sodium Latest Ref Range: 136 - 145 mmol/L 138   Potassium Latest Ref Range: 3.5 - 5.1 mmol/L 4.7   Chloride Latest Ref Range: 95 - 110 mmol/L 102   CO2 Latest Ref Range: 23 - 29 mmol/L 25   Anion Gap Latest Ref Range: 8 - 16 mmol/L 11   BUN, Bld Latest Ref Range: 8 - 23 mg/dL 29 (H)   Creatinine Latest Ref Range: 0.5 - 1.4 mg/dL 1.4   eGFR if non African American Latest Ref Range: >60 mL/min/1.73 m^2 46 (A)   eGFR if African American Latest Ref Range: >60 mL/min/1.73 m^2 53 (A)   Glucose Latest Ref Range: 70 - 110 mg/dL 113 (H)   Calcium Latest Ref Range: 8.7 - 10.5 mg/dL 9.6   BNP Latest Ref Range: 0 - 99 pg/mL 369 (H)         Assessment:     Problem List Items Addressed This Visit        Pulmonary    Supplemental oxygen dependent    Current Assessment & Plan     Home Oxygen    Face to face visit with pt  regarding their home oxygen.  We have discussed the need for oxygen including continuous use, prn use or night time use (as appropriate for the pt).  We discussed the need for compliance with the therapy. We will do recertifications as necessary.            Restrictive lung disease - Primary    Current Assessment & Plan     Etiology of restrictive lung disease is unclear.  However  patient has superimposed heart failure, this  complicates true findings of lung fibrosis .  Will plan on obtaining CT of chest to further evaluate. Also suggested, If patient undergoes cardiac catheterization obtaining both right and left heart catheterization to assess right heart pressures.    Continue oxygen therapy at this time.  Would recommend optimal diuretic therapy for heart failure and continue to closely follow with cardiology.     I have discussed with pt about obtaining CT of the chest for restrictive lung disease.  We have discussed the possible findings and possible actions as a result of these findings.  The pt would like to proceed.           Relevant Orders    CT Chest Without Contrast      Follow up after CT

## 2020-01-23 NOTE — LETTER
January 26, 2020      Whit Phan MD  [address]  [address]  [facility] [address]           [facility]  [address]  [place] [address]  Phone: [phone]          Patient: Raf Fine   MR Number: [id number]   YOB: 1935   Date of Visit: 1/23/2020       Dear Dr. Whit Phan:    Thank you for referring Raf Fine to me for evaluation. Attached you will find relevant portions of my assessment and plan of care.    If you have questions, please do not hesitate to call me. I look forward to following Raf Fine along with you.    Sincerely,    Emmanuelle Lackey NP    Enclosure  CC:  No Recipients    If you would like to receive this communication electronically, please contact [email or web address] or [phone] to request more information on [employer] Link access.    For providers and/or their staff who would like to refer a patient to [facility], please contact us through our one-stop-shop provider referral line, [facility], at [phone].    If you feel you have received this communication in error or would no longer like to receive these types of communications, please e-mail [email or web address]

## 2020-01-26 NOTE — ASSESSMENT & PLAN NOTE
Etiology of restrictive lung disease is unclear.  However  patient has superimposed heart failure, this  complicates true findings of lung fibrosis .  Will plan on obtaining CT of chest to further evaluate. Also suggested, If patient undergoes cardiac catheterization obtaining both right and left heart catheterization to assess right heart pressures.    Continue oxygen therapy at this time.  Would recommend optimal diuretic therapy for heart failure and continue to closely follow with cardiology.     I have discussed with pt about obtaining CT of the chest for restrictive lung disease.  We have discussed the possible findings and possible actions as a result of these findings.  The pt would like to proceed.

## 2020-01-26 NOTE — ASSESSMENT & PLAN NOTE
Home Oxygen    Face to face visit with pt regarding their home oxygen.  We have discussed the need for oxygen including continuous use, prn use or night time use (as appropriate for the pt).  We discussed the need for compliance with the therapy. We will do recertifications as necessary.

## 2020-01-27 ENCOUNTER — HOSPITAL ENCOUNTER (OUTPATIENT)
Dept: RADIOLOGY | Facility: HOSPITAL | Age: 85
Discharge: HOME | End: 2020-01-27
Attending: NURSE PRACTITIONER
Payer: MEDICARE

## 2020-01-27 DIAGNOSIS — J98.4 RESTRICTIVE LUNG DISEASE: ICD-10-CM

## 2020-01-27 PROCEDURE — 71250 CT THORAX DX C-: CPT | Mod: TC,HCNC

## 2020-01-27 PROCEDURE — 71250 CT CHEST WITHOUT CONTRAST: ICD-10-PCS | Mod: 26,HCNC,, | Performed by: RADIOLOGY

## 2020-01-27 PROCEDURE — 71250 CT THORAX DX C-: CPT | Mod: 26,HCNC,, | Performed by: RADIOLOGY

## 2020-01-28 ENCOUNTER — TELEPHONE (OUTPATIENT)
Dept: PULMONOLOGY | Facility: CLINIC | Age: 85
End: 2020-01-28

## 2020-01-28 DIAGNOSIS — J43.9 PULMONARY EMPHYSEMA, UNSPECIFIED EMPHYSEMA TYPE: Primary | ICD-10-CM

## 2020-01-28 DIAGNOSIS — R91.8 LUNG NODULES: ICD-10-CM

## 2020-01-28 RX ORDER — TIOTROPIUM BROMIDE 18 UG/1
18 CAPSULE ORAL; RESPIRATORY (INHALATION) DAILY
Qty: 30 CAPSULE | Refills: 3 | Status: SHIPPED | OUTPATIENT
Start: 2020-01-28 | End: 2020-05-29

## 2020-01-28 NOTE — TELEPHONE ENCOUNTER
Spoke with patient and wife per CT results.  Discussed findings of emphysema.  Suggested patient starts on inhaler therapy. They agree. Will prescribe Spiriva 1 puff once a day.    Discussed patient having bilateral solid pulmonary nodules measuring largest at 0.8 cm.  Discussed having follow-up CT in 6 months.  Patient agrees.  Also discussed multiple irregular pulmonary cysts which seems to be chronic- no intervention.     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 He has already quit smoking.     Discussed to continue oxygen therapy. Continue to monitor oxygen saturation. Ok to titrate oxygen between 2-4 L per NC keeping sats above 88% (goal 92-94%).     Follow up in 6 months after CT of chest or sooner if needed.

## 2020-01-31 ENCOUNTER — PATIENT MESSAGE (OUTPATIENT)
Dept: PULMONOLOGY | Facility: CLINIC | Age: 85
End: 2020-01-31

## 2020-01-31 ENCOUNTER — PATIENT MESSAGE (OUTPATIENT)
Dept: PRIMARY CARE CLINIC | Facility: CLINIC | Age: 85
End: 2020-01-31

## 2020-02-03 ENCOUNTER — PATIENT MESSAGE (OUTPATIENT)
Dept: PULMONOLOGY | Facility: CLINIC | Age: 85
End: 2020-02-03

## 2020-02-04 ENCOUNTER — OFFICE VISIT (OUTPATIENT)
Dept: PRIMARY CARE CLINIC | Facility: CLINIC | Age: 85
End: 2020-02-04
Payer: MEDICARE

## 2020-02-04 VITALS
TEMPERATURE: 97 F | WEIGHT: 204.38 LBS | BODY MASS INDEX: 31.07 KG/M2 | HEART RATE: 72 BPM | SYSTOLIC BLOOD PRESSURE: 122 MMHG | DIASTOLIC BLOOD PRESSURE: 72 MMHG | OXYGEN SATURATION: 91 %

## 2020-02-04 DIAGNOSIS — Z99.81 SUPPLEMENTAL OXYGEN DEPENDENT: ICD-10-CM

## 2020-02-04 DIAGNOSIS — R09.02 HYPOXIA: ICD-10-CM

## 2020-02-04 DIAGNOSIS — I50.42 CHRONIC COMBINED SYSTOLIC (CONGESTIVE) AND DIASTOLIC (CONGESTIVE) HEART FAILURE: ICD-10-CM

## 2020-02-04 DIAGNOSIS — J43.8 OTHER EMPHYSEMA: Primary | ICD-10-CM

## 2020-02-04 PROCEDURE — 3074F SYST BP LT 130 MM HG: CPT | Mod: HCNC,CPTII,S$GLB, | Performed by: INTERNAL MEDICINE

## 2020-02-04 PROCEDURE — 1126F PR PAIN SEVERITY QUANTIFIED, NO PAIN PRESENT: ICD-10-PCS | Mod: HCNC,S$GLB,, | Performed by: INTERNAL MEDICINE

## 2020-02-04 PROCEDURE — 1159F PR MEDICATION LIST DOCUMENTED IN MEDICAL RECORD: ICD-10-PCS | Mod: HCNC,S$GLB,, | Performed by: INTERNAL MEDICINE

## 2020-02-04 PROCEDURE — 99999 PR PBB SHADOW E&M-EST. PATIENT-LVL III: CPT | Mod: PBBFAC,HCNC,, | Performed by: INTERNAL MEDICINE

## 2020-02-04 PROCEDURE — 3078F DIAST BP <80 MM HG: CPT | Mod: HCNC,CPTII,S$GLB, | Performed by: INTERNAL MEDICINE

## 2020-02-04 PROCEDURE — 3074F PR MOST RECENT SYSTOLIC BLOOD PRESSURE < 130 MM HG: ICD-10-PCS | Mod: HCNC,CPTII,S$GLB, | Performed by: INTERNAL MEDICINE

## 2020-02-04 PROCEDURE — 99214 OFFICE O/P EST MOD 30 MIN: CPT | Mod: HCNC,S$GLB,, | Performed by: INTERNAL MEDICINE

## 2020-02-04 PROCEDURE — 1101F PR PT FALLS ASSESS DOC 0-1 FALLS W/OUT INJ PAST YR: ICD-10-PCS | Mod: HCNC,CPTII,S$GLB, | Performed by: INTERNAL MEDICINE

## 2020-02-04 PROCEDURE — 99214 PR OFFICE/OUTPT VISIT, EST, LEVL IV, 30-39 MIN: ICD-10-PCS | Mod: HCNC,S$GLB,, | Performed by: INTERNAL MEDICINE

## 2020-02-04 PROCEDURE — 1159F MED LIST DOCD IN RCRD: CPT | Mod: HCNC,S$GLB,, | Performed by: INTERNAL MEDICINE

## 2020-02-04 PROCEDURE — 1126F AMNT PAIN NOTED NONE PRSNT: CPT | Mod: HCNC,S$GLB,, | Performed by: INTERNAL MEDICINE

## 2020-02-04 PROCEDURE — 99999 PR PBB SHADOW E&M-EST. PATIENT-LVL III: ICD-10-PCS | Mod: PBBFAC,HCNC,, | Performed by: INTERNAL MEDICINE

## 2020-02-04 PROCEDURE — 1101F PT FALLS ASSESS-DOCD LE1/YR: CPT | Mod: HCNC,CPTII,S$GLB, | Performed by: INTERNAL MEDICINE

## 2020-02-04 PROCEDURE — 3078F PR MOST RECENT DIASTOLIC BLOOD PRESSURE < 80 MM HG: ICD-10-PCS | Mod: HCNC,CPTII,S$GLB, | Performed by: INTERNAL MEDICINE

## 2020-02-04 RX ORDER — TORSEMIDE 10 MG/1
10 TABLET ORAL DAILY
COMMUNITY
End: 2020-04-28 | Stop reason: SDUPTHER

## 2020-02-04 NOTE — PROGRESS NOTES
Subjective:       Patient ID: Raf Fine is a 85 y.o. male.    Chief Complaint: Hospital Follow Up    HPI:  Returns to [facility] for initial hospital follow up.  Recently hospitalized for respiratory failure in setting new onset heart failure.  Initially required BiPap for respiratory support.   He responded well to diuresis.  Home medication regimen was adjusted.  Patient discharged to home with assistance of [facility].  Home supplemental oxygen arranged.    Completed Pulmonary evaluation at [facility].  Notes and recommendations reviewed.  Patient to begin Spiriva, and complete repeat CT Chest with return to Pulmonary clinic following CT (July 2020).      Dr Vasquez, [facility], changed torsemide to 10 mg daily.  Will see him again 2/14/20.   Home weights reviewed, stable in range of 202 lbs.     Patient accompanied today by his wife.  In wheelchair for visit, but ambulatory at home.  Wearing supplemental oxygen.  Independent with ADL's.  Reports compliance with medication.     Review of Systems   Constitutional: Negative for chills and fever.   HENT: Negative for hearing loss.    Eyes: Negative for blurred vision.   Respiratory: Negative for cough, shortness of breath and wheezing.    Cardiovascular: Negative for chest pain, orthopnea, leg swelling and PND.   Gastrointestinal: Negative for heartburn, nausea and vomiting.   Genitourinary: Negative for dysuria.   Musculoskeletal: Negative for falls.   Skin: Negative for rash.   Neurological: Negative for dizziness.   Endo/Heme/Allergies: Bruises/bleeds easily.   Psychiatric/Behavioral: Negative for depression. The patient is not nervous/anxious and does not have insomnia.            Objective:      Vital Signs:  Vitals:    02/04/20 1052   BP: 122/72   Pulse: 72   Temp: 97.1 °F (36.2 °C)     Wt Readings from Last 1 Encounters:   02/04/20 1052 92.7 kg (204 lb 5.9 oz)     Body mass index is 31.07 kg/m².   SpO2 Readings from Last 1  Encounters:   02/04/20 (!) 91%       Physical Exam   Constitutional: He is oriented to person, place, and time. He appears well-developed and well-nourished. No distress.   HENT:   Head: Normocephalic.   Eyes: Pupils are equal, round, and reactive to light.   Neck: Normal range of motion. No JVD present.   Cardiovascular: Normal rate and regular rhythm.   No murmur heard.  Pulmonary/Chest: Effort normal and breath sounds normal. He has no wheezes.   Abdominal: Soft. Bowel sounds are normal. He exhibits no distension.   Musculoskeletal: He exhibits no edema.   Neurological: He is alert and oriented to person, place, and time.   Skin: Skin is warm and dry.   Psychiatric: He has a normal mood and affect. His behavior is normal.           Assessment:       1. Other emphysema    2. Hypoxia    3. Supplemental oxygen dependent    4. Chronic combined systolic (congestive) and diastolic (congestive) heart failure        Plan:       Diagnoses and all orders for this visit:    Other emphysema  Hypoxia  Supplemental oxygen dependent  - established with Pulmonary team- recently started Spiriva  - has repeat CT Lungs and Pulmonary visit planned for July 2020  - has supplemental oxygen in place    Chronic combined systolic (congestive) and diastolic (congestive) heart failure  - new diagnosis  - on appropriate medical therapy presently   - followed by Dr Festus Vasquez ([facility])- will return on 2/14/20  - stable on current dose of Torsemide       Patient will be released from [facility].  He will establish new Primary Care, Dr Carrie Garcia, 3/17/20.       Scheduled Follow-up :  Future Appointments   Date Time Provider Department Center   3/17/2020 10:00 AM Carrie Garcia MD [facility] [place]   7/28/2020 10:00 AM [facility] CT1 LIMIT 400 LBS [facility] CT SCAN \[facility]\""       Post Visit Medication List:     Medication List           Accurate as of February 4, 2020 11:30 AM. If you have any questions, ask your nurse or doctor.                CONTINUE taking these medications    Accu-Chek Lily Plus test strp Strp  Generic drug:  blood sugar diagnostic     Accu-Chek Softclix Lancets Misc  Generic drug:  lancets     amLODIPine 10 MG tablet  Commonly known as:  NORVASC  Take 1 tablet (10 mg total) by mouth once daily.     aspirin 81 MG EC tablet  Commonly known as:  ECOTRIN  Take 1 tablet (81 mg total) by mouth once daily.     carvediloL 6.25 MG tablet  Commonly known as:  COREG  Take 1 tablet (6.25 mg total) by mouth 2 (two) times daily.     glipizide-metformin 2.5-500 mg per tablet  Commonly known as:  METAGLIP  TAKE 1 TABLET TWICE DAILY     ICAPS ORAL     Januvia 100 MG Tab  Generic drug:  SITagliptin  TAKE 1 TABLET EVERY DAY ( NEED APPT WITH MD)     lisinopril 40 MG tablet  Commonly known as:  PRINIVIL,ZESTRIL  Take 1 tablet (40 mg total) by mouth once daily.     simvastatin 20 MG tablet  Commonly known as:  ZOCOR  TAKE 1 TABLET EVERY DAY     spironolactone 25 MG tablet  Commonly known as:  ALDACTONE  Take 1/2 tablets (12.5 mg total) by mouth once daily.     tiotropium 18 mcg inhalation capsule  Commonly known as:  Spiriva with HandiHaler  Inhale 1 capsule (18 mcg total) into the lungs once daily. Controller     torsemide 10 MG Tab  Commonly known as:  DEMADEX

## 2020-02-12 ENCOUNTER — EXTERNAL HOME HEALTH (OUTPATIENT)
Dept: HOME HEALTH SERVICES | Facility: HOSPITAL | Age: 85
End: 2020-02-12

## 2020-02-24 RX ORDER — GLIPIZIDE AND METFORMIN HCL 2.5; 5 MG/1; MG/1
1 TABLET, FILM COATED ORAL 2 TIMES DAILY
Qty: 180 TABLET | Refills: 3 | Status: CANCELLED | OUTPATIENT
Start: 2020-02-24

## 2020-02-27 RX ORDER — GLIPIZIDE AND METFORMIN HCL 2.5; 5 MG/1; MG/1
1 TABLET, FILM COATED ORAL 2 TIMES DAILY
Qty: 180 TABLET | Refills: 3 | Status: SHIPPED | OUTPATIENT
Start: 2020-02-27

## 2020-02-27 RX ORDER — SIMVASTATIN 20 MG/1
20 TABLET, FILM COATED ORAL DAILY
Qty: 90 TABLET | Refills: 3 | Status: SHIPPED | OUTPATIENT
Start: 2020-02-27

## 2020-03-31 RX ORDER — LANCETS
EACH MISCELLANEOUS
Qty: 100 EACH | Refills: 3 | Status: SHIPPED | OUTPATIENT
Start: 2020-03-31

## 2020-03-31 RX ORDER — BLOOD SUGAR DIAGNOSTIC
STRIP MISCELLANEOUS
Qty: 100 STRIP | Refills: 3 | Status: SHIPPED | OUTPATIENT
Start: 2020-03-31

## 2020-04-01 ENCOUNTER — PATIENT MESSAGE (OUTPATIENT)
Dept: ADMINISTRATIVE | Facility: OTHER | Age: 85
End: 2020-04-01

## 2020-04-02 ENCOUNTER — OFFICE VISIT (OUTPATIENT)
Dept: FAMILY MEDICINE | Facility: CLINIC | Age: 85
End: 2020-04-02
Payer: MEDICARE

## 2020-04-02 ENCOUNTER — PATIENT MESSAGE (OUTPATIENT)
Dept: FAMILY MEDICINE | Facility: CLINIC | Age: 85
End: 2020-04-02

## 2020-04-02 DIAGNOSIS — Z99.81 SUPPLEMENTAL OXYGEN DEPENDENT: ICD-10-CM

## 2020-04-02 DIAGNOSIS — E11.9 TYPE 2 DIABETES MELLITUS WITHOUT COMPLICATION, WITHOUT LONG-TERM CURRENT USE OF INSULIN: Primary | ICD-10-CM

## 2020-04-02 DIAGNOSIS — E11.9 TYPE 2 DIABETES MELLITUS: ICD-10-CM

## 2020-04-02 DIAGNOSIS — I50.42 CHRONIC COMBINED SYSTOLIC (CONGESTIVE) AND DIASTOLIC (CONGESTIVE) HEART FAILURE: ICD-10-CM

## 2020-04-02 DIAGNOSIS — J43.8 OTHER EMPHYSEMA: ICD-10-CM

## 2020-04-02 PROCEDURE — 99214 OFFICE O/P EST MOD 30 MIN: CPT | Mod: HCNC,95,, | Performed by: INTERNAL MEDICINE

## 2020-04-02 PROCEDURE — 1159F PR MEDICATION LIST DOCUMENTED IN MEDICAL RECORD: ICD-10-PCS | Mod: HCNC,95,, | Performed by: INTERNAL MEDICINE

## 2020-04-02 PROCEDURE — 1101F PR PT FALLS ASSESS DOC 0-1 FALLS W/OUT INJ PAST YR: ICD-10-PCS | Mod: HCNC,CPTII,95, | Performed by: INTERNAL MEDICINE

## 2020-04-02 PROCEDURE — 1101F PT FALLS ASSESS-DOCD LE1/YR: CPT | Mod: HCNC,CPTII,95, | Performed by: INTERNAL MEDICINE

## 2020-04-02 PROCEDURE — 99499 RISK ADDL DX/OHS AUDIT: ICD-10-PCS | Mod: HCNC,95,, | Performed by: INTERNAL MEDICINE

## 2020-04-02 PROCEDURE — 99214 PR OFFICE/OUTPT VISIT, EST, LEVL IV, 30-39 MIN: ICD-10-PCS | Mod: HCNC,95,, | Performed by: INTERNAL MEDICINE

## 2020-04-02 PROCEDURE — 1159F MED LIST DOCD IN RCRD: CPT | Mod: HCNC,95,, | Performed by: INTERNAL MEDICINE

## 2020-04-02 PROCEDURE — 3051F PR MOST RECENT HEMOGLOBIN A1C LEVEL 7.0 - < 8.0%: ICD-10-PCS | Mod: HCNC,CPTII,95, | Performed by: INTERNAL MEDICINE

## 2020-04-02 PROCEDURE — 99499 UNLISTED E&M SERVICE: CPT | Mod: HCNC,95,, | Performed by: INTERNAL MEDICINE

## 2020-04-02 PROCEDURE — 3051F HG A1C>EQUAL 7.0%<8.0%: CPT | Mod: HCNC,CPTII,95, | Performed by: INTERNAL MEDICINE

## 2020-04-02 NOTE — PROGRESS NOTES
The patient location is: home  The chief complaint leading to consultation is: hospital f/u   Visit type: Virtual visit with synchronous audio and video  Total time spent with patient:  17 minEach patient to whom he or she provides medical services by telemedicine is:  (1) informed of the relationship between the physician and patient and the respective role of any other health care provider with respect to management of the patient; and (2) notified that he or she may decline to receive medical services by telemedicine and may withdraw from such care at any time.    [facility] Note    Chief Complaint    here for hosp f/u from chf exacerbation     History of Present Illness      Raf Fine is a 85 y.o. male who presents today for No chief complaint on file.  .  Patient comes to appointment hosp f/u for chf exacerbations .he is feeling well is on supplemental o2 currently . Is complaint with all meds and low sodium diet  I well . I ave review hosp discharge summary . He will be seeing cardiology soon may 15 th for f/u . He is very strict with diet currently has lost some weight recently wife state today 197 lbs down form 236 on day of admission .     HPI    No problem-specific Assessment & Plan notes found for this encounter.       Problem List Items Addressed This Visit        Pulmonary    Supplemental oxygen dependent    Overview     Stable          Other emphysema    Overview     Dr alejnadra gutierrez managing is on spiriva currently            Cardiac/Vascular    Chronic combined systolic (congestive) and diastolic (congestive) heart failure    Overview     Cards managing on stabel regimen bp is running hihg 90 /high 60 but asymptomatic currently . Has f/u viist with cards on may 15 th             Endocrine    Diabetes mellitus, type II - Primary    Overview     Last bs was 112 will hold off on a1c to minimize his exposure . Cont low carb diet                 Past Medical  History:  Past Medical History:   Diagnosis Date    Bundle branch block, trifascicular 12/3/2019    Diabetes mellitus, type 2     Hyperlipidemia     Hypertension     Stroke        Past Surgical History:  Past Surgical History:   Procedure Laterality Date    APPENDECTOMY      FOOT SURGERY      HERNIA REPAIR      LEG SURGERY      PROSTATE SURGERY      TONSILLECTOMY         Family History:  family history is not on file.     Social History:  Social History     Socioeconomic History    Marital status:      Spouse name: Not on file    Number of children: Not on file    Years of education: Not on file    Highest education level: Not on file   Occupational History    Not on file   Social Needs    Financial resource strain: Not hard at all    Food insecurity:     Worry: Never true     Inability: Never true    Transportation needs:     Medical: No     Non-medical: No   Tobacco Use    Smoking status: Former Smoker     Packs/day: 1.00     Years: 50.00     Pack years: 50.00     Last attempt to quit: 2006     Years since quittin.2    Smokeless tobacco: Never Used   Substance and Sexual Activity    Alcohol use: No     Frequency: Never     Drinks per session: Patient refused     Binge frequency: Never    Drug use: No    Sexual activity: Yes     Partners: Female   Lifestyle    Physical activity:     Days per week: 0 days     Minutes per session: 0 min    Stress: Not at all   Relationships    Social connections:     Talks on phone: Never     Gets together: Once a week     Attends L[employer] day service: More than 4 times per year     Active member of club or organization: No     Attends meetings of clubs or organizations: Never     Relationship status:    Other Topics Concern    Not on file   Social History Narrative    Not on file       Review of Systems:   Review of Systems   HENT: Negative for hearing loss.    Eyes: Negative for discharge.   Respiratory: Negative for wheezing.     Cardiovascular: Negative for chest pain and palpitations.   Gastrointestinal: Negative for blood in stool, constipation, diarrhea and vomiting.   Genitourinary: Negative for hematuria and urgency.   Musculoskeletal: Negative for neck pain.   Neurological: Negative for weakness and headaches.   Endo/Heme/Allergies: Negative for polydipsia.        Medications:  Outpatient Encounter Medications as of 4/2/2020   Medication Sig Dispense Refill    ACCU-CHEK EDITH PLUS TEST STRP Strp USE TO TEST BLOOD SUGAR EVERY DAY  100 strip 3    ACCU-CHEK SOFTCLIX LANCETS Misc USE TO TEST BLOOD SUGAR ONE TIME DAILY  100 each 3    aspirin (ECOTRIN) 81 MG EC tablet Take 1 tablet (81 mg total) by mouth once daily. 30 tablet 0    B2/vits A,C,E/lut/zeaxanth/min (ICAPS ORAL) Take by mouth.      carvediloL (COREG) 6.25 MG tablet Take 1 tablet (6.25 mg total) by mouth 2 (two) times daily. 60 tablet 11    glipizide-metformin (METAGLIP) 2.5-500 mg per tablet Take 1 tablet by mouth 2 (two) times daily. 180 tablet 3    JANUVIA 100 mg Tab TAKE 1 TABLET EVERY DAY ( NEED APPT WITH MD) 90 tablet 3    lisinopril (PRINIVIL,ZESTRIL) 40 MG tablet Take 1 tablet (40 mg total) by mouth once daily. 90 tablet 3    simvastatin (ZOCOR) 20 MG tablet Take 1 tablet (20 mg total) by mouth once daily. 90 tablet 3    tiotropium (SPIRIVA WITH HANDIHALER) 18 mcg inhalation capsule Inhale 1 capsule (18 mcg total) into the lungs once daily. Controller 30 capsule 3    torsemide (DEMADEX) 10 MG Tab Take 10 mg by mouth once daily.      [DISCONTINUED] ACCU-CHEK EDITH PLUS TEST STRP Strp       [DISCONTINUED] ACCU-CHEK SOFTCLIX LANCETS Misc       [DISCONTINUED] amLODIPine (NORVASC) 10 MG tablet Take 1 tablet (10 mg total) by mouth once daily. (Patient not taking: Reported on 2/4/2020) 30 tablet 11    [DISCONTINUED] spironolactone (ALDACTONE) 25 MG tablet Take 1/2 tablets (12.5 mg total) by mouth once daily. (Patient not taking: Reported on 2/4/2020) 15 tablet  11     No facility-administered encounter medications on file as of 4/2/2020.        Allergies:  Review of patient's allergies indicates:  No Known Allergies      Physical Exam      There were no vitals filed for this visit.      ]     There is no height or weight on file to calculate BMI.    Physical Exam   Constitutional: He is oriented to person, place, and time. He appears well-developed and well-nourished. No distress.   Eyes: EOM are normal.   Neurological: He is alert and oriented to person, place, and time.   Skin: He is not diaphoretic.   Psychiatric: He has a normal mood and affect. His behavior is normal. Judgment and thought content normal.        Laboratory:  CBC:  No results for input(s): WBC, RBC, HGB, HCT, PLT, MCV, MCH, MCHC in the last 2160 hours.  CMP:  Recent Labs   Lab Result Units 01/17/20  0950   Glucose mg/dL 113*   Calcium mg/dL 9.6   Sodium mmol/L 138   Potassium mmol/L 4.7   CO2 mmol/L 25   Chloride mmol/L 102   BUN, Bld mg/dL 29*     URINALYSIS:  No results for input(s): COLORU, CLARITYU, SPECGRAV, PHUR, PROTEINUA, GLUCOSEU, BILIRUBINCON, BLOODU, WBCU, RBCU, BACTERIA, MUCUS, NITRITE, LEUKOCYTESUR, UROBILINOGEN, HYALINECASTS in the last 2160 hours.   LIPIDS:  No results for input(s): TSH, HDL, CHOL, TRIG, LDLCALC, CHOLHDL, NONHDLCHOL, TOTALCHOLEST in the last 2160 hours.  TSH:  No results for input(s): TSH in the last 2160 hours.  A1C:  No results for input(s): HGBA1C in the last 2160 hours.    Radiology:        Assessment:     Raf Fine is a 85 y.o.male with:    Type 2 diabetes mellitus without complication, without long-term current use of insulin    Chronic combined systolic (congestive) and diastolic (congestive) heart failure    Other emphysema    Supplemental oxygen dependent                Plan:     Problem List Items Addressed This Visit        Pulmonary    Supplemental oxygen dependent    Overview     Stable          Other emphysema    Overview     Dr alejandra gutierrez managing  is on spiriva currently            Cardiac/Vascular    Chronic combined systolic (congestive) and diastolic (congestive) heart failure    Overview     Cards managing on stabel regimen bp is running hihg 90 /high 60 but asymptomatic currently . Has f/u viist with cards on may 15 th             Endocrine    Diabetes mellitus, type II - Primary    Overview     Last bs was 112 will hold off on a1c to minimize his exposure . Cont low carb diet                As above, continue current medications and maintain follow up with specialists.  Return to clinic in 3  months.    Frederick W Dantagnan [facility] [place]                Answers for HPI/ROS submitted by the patient on 4/2/2020   activity change: No  unexpected weight change: Yes  rhinorrhea: No  trouble swallowing: No  visual disturbance: No  chest tightness: No  polyuria: No  difficulty urinating: No  joint swelling: No  arthralgias: No  confusion: No  dysphoric mood: No

## 2020-04-28 RX ORDER — TORSEMIDE 10 MG/1
10 TABLET ORAL DAILY
Qty: 30 TABLET | Refills: 3 | Status: SHIPPED | OUTPATIENT
Start: 2020-04-28 | End: 2020-07-20

## 2020-05-25 ENCOUNTER — PATIENT OUTREACH (OUTPATIENT)
Dept: OTHER | Facility: OTHER | Age: 85
End: 2020-05-25

## 2020-05-29 DIAGNOSIS — J43.9 PULMONARY EMPHYSEMA, UNSPECIFIED EMPHYSEMA TYPE: ICD-10-CM

## 2020-05-29 RX ORDER — TIOTROPIUM BROMIDE 18 UG/1
CAPSULE ORAL; RESPIRATORY (INHALATION)
Qty: 30 CAPSULE | Refills: 3 | Status: SHIPPED | OUTPATIENT
Start: 2020-05-29

## 2020-07-14 ENCOUNTER — OFFICE VISIT (OUTPATIENT)
Dept: FAMILY MEDICINE | Facility: CLINIC | Age: 85
End: 2020-07-14
Payer: MEDICARE

## 2020-07-14 VITALS
HEIGHT: 68 IN | OXYGEN SATURATION: 93 % | DIASTOLIC BLOOD PRESSURE: 70 MMHG | BODY MASS INDEX: 30.17 KG/M2 | TEMPERATURE: 98 F | RESPIRATION RATE: 16 BRPM | WEIGHT: 199.06 LBS | HEART RATE: 78 BPM | SYSTOLIC BLOOD PRESSURE: 142 MMHG

## 2020-07-14 DIAGNOSIS — I10 ESSENTIAL HYPERTENSION: ICD-10-CM

## 2020-07-14 DIAGNOSIS — E78.2 MIXED HYPERLIPIDEMIA: ICD-10-CM

## 2020-07-14 DIAGNOSIS — I45.3 BUNDLE BRANCH BLOCK, TRIFASCICULAR: ICD-10-CM

## 2020-07-14 DIAGNOSIS — I50.42 CHRONIC COMBINED SYSTOLIC (CONGESTIVE) AND DIASTOLIC (CONGESTIVE) HEART FAILURE: ICD-10-CM

## 2020-07-14 DIAGNOSIS — Z23 NEED FOR STREPTOCOCCUS PNEUMONIAE VACCINATION: ICD-10-CM

## 2020-07-14 DIAGNOSIS — Z99.81 SUPPLEMENTAL OXYGEN DEPENDENT: ICD-10-CM

## 2020-07-14 DIAGNOSIS — E11.9 TYPE 2 DIABETES MELLITUS WITHOUT COMPLICATION, WITHOUT LONG-TERM CURRENT USE OF INSULIN: Primary | ICD-10-CM

## 2020-07-14 DIAGNOSIS — E66.09 CLASS 1 OBESITY DUE TO EXCESS CALORIES WITH SERIOUS COMORBIDITY AND BODY MASS INDEX (BMI) OF 30.0 TO 30.9 IN ADULT: ICD-10-CM

## 2020-07-14 DIAGNOSIS — J43.8 OTHER EMPHYSEMA: ICD-10-CM

## 2020-07-14 PROCEDURE — 90670 PCV13 VACCINE IM: CPT | Mod: HCNC,S$GLB,, | Performed by: INTERNAL MEDICINE

## 2020-07-14 PROCEDURE — 99214 PR OFFICE/OUTPT VISIT, EST, LEVL IV, 30-39 MIN: ICD-10-PCS | Mod: HCNC,S$GLB,25, | Performed by: INTERNAL MEDICINE

## 2020-07-14 PROCEDURE — 99999 PR PBB SHADOW E&M-EST. PATIENT-LVL IV: ICD-10-PCS | Mod: PBBFAC,HCNC,, | Performed by: INTERNAL MEDICINE

## 2020-07-14 PROCEDURE — 90670 PNEUMOCOCCAL CONJUGATE VACCINE 13-VALENT LESS THAN 5YO & GREATER THAN: ICD-10-PCS | Mod: HCNC,S$GLB,, | Performed by: INTERNAL MEDICINE

## 2020-07-14 PROCEDURE — 99499 RISK ADDL DX/OHS AUDIT: ICD-10-PCS | Mod: S$GLB,,, | Performed by: INTERNAL MEDICINE

## 2020-07-14 PROCEDURE — 1159F PR MEDICATION LIST DOCUMENTED IN MEDICAL RECORD: ICD-10-PCS | Mod: HCNC,S$GLB,, | Performed by: INTERNAL MEDICINE

## 2020-07-14 PROCEDURE — 3078F PR MOST RECENT DIASTOLIC BLOOD PRESSURE < 80 MM HG: ICD-10-PCS | Mod: HCNC,CPTII,S$GLB, | Performed by: INTERNAL MEDICINE

## 2020-07-14 PROCEDURE — 3077F PR MOST RECENT SYSTOLIC BLOOD PRESSURE >= 140 MM HG: ICD-10-PCS | Mod: HCNC,CPTII,S$GLB, | Performed by: INTERNAL MEDICINE

## 2020-07-14 PROCEDURE — 3077F SYST BP >= 140 MM HG: CPT | Mod: HCNC,CPTII,S$GLB, | Performed by: INTERNAL MEDICINE

## 2020-07-14 PROCEDURE — 99214 OFFICE O/P EST MOD 30 MIN: CPT | Mod: HCNC,S$GLB,25, | Performed by: INTERNAL MEDICINE

## 2020-07-14 PROCEDURE — 1101F PR PT FALLS ASSESS DOC 0-1 FALLS W/OUT INJ PAST YR: ICD-10-PCS | Mod: HCNC,CPTII,S$GLB, | Performed by: INTERNAL MEDICINE

## 2020-07-14 PROCEDURE — 99499 UNLISTED E&M SERVICE: CPT | Mod: S$GLB,,, | Performed by: INTERNAL MEDICINE

## 2020-07-14 PROCEDURE — 1159F MED LIST DOCD IN RCRD: CPT | Mod: HCNC,S$GLB,, | Performed by: INTERNAL MEDICINE

## 2020-07-14 PROCEDURE — G0009 PNEUMOCOCCAL CONJUGATE VACCINE 13-VALENT LESS THAN 5YO & GREATER THAN: ICD-10-PCS | Mod: HCNC,S$GLB,, | Performed by: INTERNAL MEDICINE

## 2020-07-14 PROCEDURE — G0009 ADMIN PNEUMOCOCCAL VACCINE: HCPCS | Mod: HCNC,S$GLB,, | Performed by: INTERNAL MEDICINE

## 2020-07-14 PROCEDURE — 1101F PT FALLS ASSESS-DOCD LE1/YR: CPT | Mod: HCNC,CPTII,S$GLB, | Performed by: INTERNAL MEDICINE

## 2020-07-14 PROCEDURE — 3078F DIAST BP <80 MM HG: CPT | Mod: HCNC,CPTII,S$GLB, | Performed by: INTERNAL MEDICINE

## 2020-07-14 PROCEDURE — 99999 PR PBB SHADOW E&M-EST. PATIENT-LVL IV: CPT | Mod: PBBFAC,HCNC,, | Performed by: INTERNAL MEDICINE

## 2020-07-14 NOTE — PROGRESS NOTES
[facility] Note    Chief Complaint      Chief Complaint   Patient presents with    Follow-up       History of Present Illness      Raf Fine is a 85 y.o. male who presents today for   Chief Complaint   Patient presents with    Follow-up   .  Patient comes to appointment here for 3m checkup for chronic issues as below . He is currently utilizing supplemental o2 . He is due for full labs with this visit . He is past due for optho eval he sees dr ivan I have encouraged him to schedule . He needs prevnar vaccine to complete pneumonia series . He is at visit today with his wife. She states he is at his baseline .     HPI    No problem-specific Assessment & Plan notes found for this encounter.       Problem List Items Addressed This Visit        Pulmonary    Supplemental oxygen dependent    Overview     Stable          Other emphysema    Overview     Dr elana roberts pulm managing is on spiriva currently. Is scheduled for ct scan of chest next week and has appt with pulmonologist after scan             Cardiac/Vascular    Essential hypertension    Overview     Cont current regimen   Stable          Bundle branch block, trifascicular    Overview     Stable          Chronic combined systolic (congestive) and diastolic (congestive) heart failure    Overview     Cards managing dr miguel galindo  on stable regimen . Coreg , demadex . He is utilizing o2 as well for  his emphysema . Mild crackles noted he will do his demadex daily for next three days then back to every other day            Endocrine    Diabetes mellitus, type II - Primary    Overview     Cont  Low carb diet , needs a1c today . Needs optho eval as well see hpi .         Class 1 obesity due to excess calories with serious comorbidity and body mass index (BMI) of 30.0 to 30.9 in adult    Overview     Cont to encourage diet exercising is obviously difficult secondary to chronic conditions            Other Visit Diagnoses      Need for Streptococcus pneumoniae vaccination        Mixed hyperlipidemia               Past Medical History:  Past Medical History:   Diagnosis Date    Bundle branch block, trifascicular 12/3/2019    Diabetes mellitus, type 2     Emphysema of lung     Hyperlipidemia     Hypertension     Lung disease 2020    Stroke        Past Surgical History:  Past Surgical History:   Procedure Laterality Date    APPENDECTOMY      FOOT SURGERY      HERNIA REPAIR      LEG SURGERY      PROSTATE SURGERY      TONSILLECTOMY         Family History:  family history is not on file.     Social History:  Social History     Socioeconomic History    Marital status:      Spouse name: Not on file    Number of children: Not on file    Years of education: Not on file    Highest education level: Not on file   Occupational History    Not on file   Social Needs    Financial resource strain: Not hard at all    Food insecurity     Worry: Never true     Inability: Never true    Transportation needs     Medical: No     Non-medical: No   Tobacco Use    Smoking status: Former Smoker     Packs/day: 1.00     Years: 50.00     Pack years: 50.00     Quit date: 2006     Years since quittin.4    Smokeless tobacco: Never Used   Substance and Sexual Activity    Alcohol use: No     Frequency: Never     Drinks per session: Patient refused     Binge frequency: Never    Drug use: No    Sexual activity: Yes     Partners: Female   Lifestyle    Physical activity     Days per week: 0 days     Minutes per session: 0 min    Stress: Not at all   Relationships    Social connections     Talks on phone: Never     Gets together: Once a week     Attends Jainism service: More than 4 times per year     Active member of club or organization: No     Attends meetings of clubs or organizations: Never     Relationship status:    Other Topics Concern    Not on file   Social History Narrative    Not on file       Review of  Systems:   Review of Systems   HENT: Positive for hearing loss.    Eyes: Negative for discharge.   Respiratory: Positive for cough. Negative for sputum production and wheezing.    Cardiovascular: Negative for chest pain and palpitations.   Gastrointestinal: Negative for blood in stool, constipation, diarrhea and vomiting.   Genitourinary: Negative for hematuria and urgency.   Musculoskeletal: Negative for neck pain.   Neurological: Negative for weakness and headaches.   Endo/Heme/Allergies: Negative for polydipsia.   Psychiatric/Behavioral: Negative for depression. The patient is not nervous/anxious and does not have insomnia.         Medications:  Outpatient Encounter Medications as of 7/14/2020   Medication Sig Dispense Refill    ACCU-CHEK EDITH PLUS TEST STRP Strp USE TO TEST BLOOD SUGAR EVERY DAY  100 strip 3    ACCU-CHEK SOFTCLIX LANCETS Misc USE TO TEST BLOOD SUGAR ONE TIME DAILY  100 each 3    aspirin (ECOTRIN) 81 MG EC tablet Take 1 tablet (81 mg total) by mouth once daily. 30 tablet 0    B2/vits A,C,E/lut/zeaxanth/min (ICAPS ORAL) Take by mouth.      carvediloL (COREG) 6.25 MG tablet Take 1 tablet (6.25 mg total) by mouth 2 (two) times daily. 60 tablet 11    glipizide-metformin (METAGLIP) 2.5-500 mg per tablet Take 1 tablet by mouth 2 (two) times daily. 180 tablet 3    JANUVIA 100 mg Tab TAKE 1 TABLET EVERY DAY ( NEED APPT WITH MD) 90 tablet 3    lisinopril (PRINIVIL,ZESTRIL) 40 MG tablet Take 1 tablet (40 mg total) by mouth once daily. 90 tablet 3    simvastatin (ZOCOR) 20 MG tablet Take 1 tablet (20 mg total) by mouth once daily. 90 tablet 3    SPIRIVA WITH HANDIHALER 18 mcg inhalation capsule PLACE 1 CAPSULE INTO HANDIHALER AND INHALE EVERY DAY 30 capsule 3    torsemide (DEMADEX) 10 MG Tab Take 1 tablet (10 mg total) by mouth once daily. 30 tablet 3    SPIRIVA WITH HANDIHALER 18 mcg inhalation capsule PLACE 1 CAPSULE INTO HANDIHALER AND INHALE EVERY DAY 30 capsule 3     No  "facility-administered encounter medications on file as of 7/14/2020.        Allergies:  Review of patient's allergies indicates:  No Known Allergies      Physical Exam      Vitals:    07/14/20 0925   BP: (!) 142/70   Pulse: 78   Resp: 16   Temp: 98 °F (36.7 °C)        Vital Signs  Temp: 98 °F (36.7 °C)  Temp src: Temporal  Pulse: 78  Resp: 16  SpO2: (!) 93 %  BP: (!) 142/70  BP Location: Right arm  Patient Position: Sitting  Height and Weight  Height: 5' 8" (172.7 cm)  Weight: 90.3 kg (199 lb 1.2 oz)  BSA (Calculated - sq m): 2.08 sq meters  BMI (Calculated): 30.3  Weight in (lb) to have BMI = 25: 164.1]     Body mass index is 30.27 kg/m².    Physical Exam  Constitutional:       Appearance: He is well-developed.   HENT:      Head: Normocephalic.   Eyes:      Pupils: Pupils are equal, round, and reactive to light.   Neck:      Musculoskeletal: Normal range of motion.      Thyroid: No thyromegaly.   Cardiovascular:      Rate and Rhythm: Normal rate and regular rhythm.      Heart sounds: No murmur. No friction rub. No gallop.    Pulmonary:      Effort: Pulmonary effort is normal. No respiratory distress.      Breath sounds: Examination of the right-lower field reveals rales. Examination of the left-lower field reveals rales. Rales present.   Abdominal:      General: Bowel sounds are normal.      Palpations: Abdomen is soft.   Musculoskeletal: Normal range of motion.   Skin:     General: Skin is warm and dry.   Neurological:      Mental Status: He is alert and oriented to person, place, and time.      Sensory: No sensory deficit.   Psychiatric:         Behavior: Behavior normal.          Laboratory:  CBC:  No results for input(s): WBC, RBC, HGB, HCT, PLT, MCV, MCH, MCHC in the last 2160 hours.  CMP:  No results for input(s): GLU, CALCIUM, ALBUMIN, PROT, NA, K, CO2, CL, BUN, ALKPHOS, ALT, AST, BILITOT in the last 2160 hours.    Invalid input(s): CREATININ  URINALYSIS:  No results for input(s): COLORU, CLARITYU, SPECGRAV, " PHUR, PROTEINUA, GLUCOSEU, BILIRUBINCON, BLOODU, WBCU, RBCU, BACTERIA, MUCUS, NITRITE, LEUKOCYTESUR, UROBILINOGEN, HYALINECASTS in the last 2160 hours.   LIPIDS:  No results for input(s): TSH, HDL, CHOL, TRIG, LDLCALC, CHOLHDL, NONHDLCHOL, TOTALCHOLEST in the last 2160 hours.  TSH:  No results for input(s): TSH in the last 2160 hours.  A1C:  No results for input(s): HGBA1C in the last 2160 hours.    Radiology:        Assessment:     Raf Fine is a 85 y.o.male with:    Type 2 diabetes mellitus without complication, without long-term current use of insulin  -     Hemoglobin A1C; Future; Expected date: 07/14/2020    Chronic combined systolic (congestive) and diastolic (congestive) heart failure    Supplemental oxygen dependent    Other emphysema    Bundle branch block, trifascicular    Class 1 obesity due to excess calories with serious comorbidity and body mass index (BMI) of 30.0 to 30.9 in adult    Essential hypertension  -     CBC auto differential; Future; Expected date: 07/14/2020    Need for Streptococcus pneumoniae vaccination  -     Pneumococcal Conjugate Vaccine (13 Valent) (IM)    Mixed hyperlipidemia  -     Comprehensive metabolic panel; Future; Expected date: 07/14/2020  -     Lipid Panel; Future; Expected date: 07/14/2020                Plan:     Problem List Items Addressed This Visit        Pulmonary    Supplemental oxygen dependent    Overview     Stable          Other emphysema    Overview     Dr elana gutierrez managing is on spiriva currently. Is scheduled for ct scan of chest next week and has appt with pulmonologist after scan             Cardiac/Vascular    Essential hypertension    Overview     Cont current regimen   Stable          Bundle branch block, trifascicular    Overview     Stable          Chronic combined systolic (congestive) and diastolic (congestive) heart failure    Overview     Cards managing dr miguel galindo  on stable regimen . Coreg , demadex . He is utilizing o2 as  well for  his emphysema . Mild crackles noted he will do his demadex daily for next three days then back to every other day            Endocrine    Diabetes mellitus, type II - Primary    Overview     Cont  Low carb diet , needs a1c today . Needs optho eval as well see hpi .         Class 1 obesity due to excess calories with serious comorbidity and body mass index (BMI) of 30.0 to 30.9 in adult    Overview     Cont to encourage diet exercising is obviously difficult secondary to chronic conditions            Other Visit Diagnoses     Need for Streptococcus pneumoniae vaccination        Mixed hyperlipidemia              As above, continue current medications and maintain follow up with specialists.  Return to clinic in 6 months.      [facility] [place]                  Answers for HPI/ROS submitted by the patient on 7/12/2020   activity change: No  unexpected weight change: No  rhinorrhea: No  trouble swallowing: No  visual disturbance: No  chest tightness: No  polyuria: No  difficulty urinating: No  joint swelling: No  arthralgias: No  confusion: No  dysphoric mood: No

## 2020-07-15 LAB
ALBUMIN: 4.4 GRAM/DL (ref 3.5–5)
ALP SERPL-CCNC: 63 UNIT/L (ref 38–126)
ALT SERPL W P-5'-P-CCNC: 10 UNIT/L (ref 7–56)
ANION GAP SERPL CALC-SCNC: 15 MEQ/L (ref 9–18)
AST SERPL-CCNC: 12 UNIT/L (ref 7–40)
BASOPHILS ABSOLUTE COUNT: 0.1 K/UL (ref 0–0.2)
BASOPHILS NFR BLD: 0.7 % (ref 0–2)
BILIRUB SERPL-MCNC: 0.7 MG/DL (ref 0–1.2)
BUN BLD-MCNC: 28 MG/DL (ref 7–21)
BUN/CREAT SERPL: 22 RATIO (ref 6–22)
CALC OSMOLALITY: 283 MOSM/KG (ref 275–295)
CALCIUM SERPL-MCNC: 9.3 MG/DL (ref 8.5–10.3)
CHLORIDE SERPL-SCNC: 103 MEQ/L (ref 98–107)
CHOL/HDLC RATIO: 2
CHOLEST SERPL-MSCNC: 138 MG/DL (ref 100–200)
CO2 SERPL-SCNC: 24 MEQ/L (ref 21–31)
CREAT SERPL-MCNC: 1.3 MG/DL (ref 0.7–1.2)
DIFFERENTIAL TYPE: NORMAL
EOSINOPHIL NFR BLD: 3.1 % (ref 0–4)
EOSINOPHILS ABSOLUTE COUNT: 0.3 K/UL (ref 0–0.7)
ERYTHROCYTE [DISTWIDTH] IN BLOOD BY AUTOMATED COUNT: 14.2 GRAM/DL (ref 12–15.3)
GFR: 53.1 ML/MIN/1.73M2
GLUCOSE SERPL-MCNC: 130 MG/DL (ref 70–100)
HBA1C MFR BLD: 5.7 % (ref 4.5–5.7)
HCT VFR BLD AUTO: 42.1 % (ref 40–52)
HDLC SERPL-MCNC: 58 MG/DL (ref 40–75)
HGB BLD-MCNC: 14.1 GRAM/DL (ref 13.6–17.5)
LDLC SERPL CALC-MCNC: 61 MG/DL (ref 0–130)
LYMPHOCYTES %: 25.1 % (ref 15–45)
LYMPHOCYTES ABSOLUTE COUNT: 2.4 K/UL (ref 1–4.2)
MCH RBC QN AUTO: 31.2 PICOGRAM (ref 27–33)
MCHC RBC AUTO-ENTMCNC: 33.4 GRAM/DL (ref 32–36)
MCV RBC AUTO: 93.4 FEMTOLITER (ref 80–94)
MONOCYTES %: 6.3 % (ref 3–13)
MONOCYTES ABSOLUTE COUNT: 0.6 K/UL (ref 0.1–0.8)
NEUTROPHILS ABSOLUTE COUNT: 6.2 K/UL (ref 2.1–7.6)
NEUTROPHILS RELATIVE PERCENT: 64.8 % (ref 32–80)
NONHDLC SERPL-MCNC: 80 MG/DL (ref 60–125)
PLATELET # BLD AUTO: 240 K/UL (ref 150–350)
PMV BLD AUTO: 9.8 FEMTOLITER (ref 7–10.2)
POTASSIUM SERPL-SCNC: 4.3 MEQ/L (ref 3.5–5)
RBC # BLD AUTO: 4.51 MIL/UL (ref 4.45–5.9)
SODIUM BLD-SCNC: 138 MEQ/L (ref 135–145)
TOTAL PROTEIN: 8.1 GRAM/DL (ref 6.3–8.2)
TRIGL SERPL-MCNC: 120 MG/DL (ref 30–150)
WBC # BLD AUTO: 9.5 K/UL (ref 4.5–11)

## 2020-07-16 ENCOUNTER — TELEPHONE (OUTPATIENT)
Dept: FAMILY MEDICINE | Facility: CLINIC | Age: 85
End: 2020-07-16

## 2020-07-16 NOTE — TELEPHONE ENCOUNTER
Spoke with patient's wife Nevaeh, due to patient's hearing impairment. Informed Nevaeh of patient's blood results. Nevaeh verbalizes understanding. Nevaeh would like to let Dr Alberts know that patient's cardiologist has put him on lasix 10mg twice daily for 4 days to reduce the fluid in the lungs.   Informed Nevaeh that I would forward that information to Dr Alberts.

## 2020-07-16 NOTE — TELEPHONE ENCOUNTER
----- Message from Oral Alberts MD sent at 7/16/2020  8:33 AM CDT -----  Labs lookg od a1c is great at 5.7 and kidney function is stable

## 2020-07-28 ENCOUNTER — HOSPITAL ENCOUNTER (OUTPATIENT)
Dept: RADIOLOGY | Facility: HOSPITAL | Age: 85
Discharge: HOME | End: 2020-07-28
Attending: NURSE PRACTITIONER
Payer: MEDICARE

## 2020-07-28 DIAGNOSIS — R91.8 LUNG NODULES: ICD-10-CM

## 2020-07-28 PROCEDURE — 71250 CT CHEST WITHOUT CONTRAST: ICD-10-PCS | Mod: 26,HCNC,, | Performed by: RADIOLOGY

## 2020-07-28 PROCEDURE — 71250 CT THORAX DX C-: CPT | Mod: 26,HCNC,, | Performed by: RADIOLOGY

## 2020-07-28 PROCEDURE — 71250 CT THORAX DX C-: CPT | Mod: TC,HCNC

## 2020-08-13 ENCOUNTER — TELEPHONE (OUTPATIENT)
Dept: PULMONOLOGY | Facility: CLINIC | Age: 85
End: 2020-08-13

## 2020-08-13 NOTE — TELEPHONE ENCOUNTER
Spoke with patient's wife about CT scan follow (7/28/2020) . I have collaborated with Sarah Beth Alvarez. CT of chest remains with very subtle changes including pulmonary nodules.      According to patient's wife, Mr. Fine does not wish to pursue any further imaging.  I agree.  Patient is 85 years old with multiple co morbidities and poor functional capacity  making him a poor candidate for radiation, chemo or surgery.     Wife  does report noticing Mr. Fine to experience episodes of shortness of breath in the AM. Explains his weight has increased from 191 lbs to 196 lbs. She has concerns for fluid overload and tells is reaching out to cardiology in the AM. She reports Mr Fine uses his oxygen 24/7. He will likely require oxygen for the foreseeable future.     It was agreed no further imaging. This is also Mr. Live's wishes.

## 2020-09-29 ENCOUNTER — PATIENT MESSAGE (OUTPATIENT)
Dept: OTHER | Facility: OTHER | Age: 85
End: 2020-09-29

## 2020-10-13 ENCOUNTER — TELEPHONE (OUTPATIENT)
Dept: FAMILY MEDICINE | Facility: CLINIC | Age: 85
End: 2020-10-13

## 2020-10-13 NOTE — TELEPHONE ENCOUNTER
----- Message from Camila Barton sent at 10/13/2020  4:04 PM CDT -----  Contact: Morteza with  's office   Morteza is calling to see if Dr Alberts would be willing to sign the death certificate for the pt. Please call and advise.

## 2020-10-13 NOTE — TELEPHONE ENCOUNTER
Morteza with coroners office states patient was outside putting up Halloween decorations and someone drove by and saw him lying on the lawn. Patient was brought to the ER by EMS and patient was only in er for 15 minutes when wife came and withdrew care. Advised  to have  home send death certificate through [facility].

## 2020-10-14 ENCOUNTER — TELEPHONE (OUTPATIENT)
Dept: FAMILY MEDICINE | Facility: CLINIC | Age: 85
End: 2020-10-14

## 2020-10-14 NOTE — TELEPHONE ENCOUNTER
----- Message from Carrie Leong sent at 10/14/2020 11:07 AM CDT -----  Contact: [facility] [phone]   home needs death certificate signed by  Please advise Christina